# Patient Record
Sex: MALE | Race: WHITE | NOT HISPANIC OR LATINO | Employment: PART TIME | URBAN - METROPOLITAN AREA
[De-identification: names, ages, dates, MRNs, and addresses within clinical notes are randomized per-mention and may not be internally consistent; named-entity substitution may affect disease eponyms.]

---

## 2019-12-24 ENCOUNTER — OFFICE VISIT (OUTPATIENT)
Dept: FAMILY MEDICINE CLINIC | Facility: CLINIC | Age: 19
End: 2019-12-24

## 2019-12-24 VITALS
WEIGHT: 282 LBS | DIASTOLIC BLOOD PRESSURE: 94 MMHG | RESPIRATION RATE: 16 BRPM | BODY MASS INDEX: 37.37 KG/M2 | TEMPERATURE: 98.4 F | HEIGHT: 73 IN | SYSTOLIC BLOOD PRESSURE: 132 MMHG | HEART RATE: 76 BPM

## 2019-12-24 DIAGNOSIS — E66.9 OBESITY, UNSPECIFIED CLASSIFICATION, UNSPECIFIED OBESITY TYPE, UNSPECIFIED WHETHER SERIOUS COMORBIDITY PRESENT: ICD-10-CM

## 2019-12-24 DIAGNOSIS — Z00.01 ANNUAL VISIT FOR GENERAL ADULT MEDICAL EXAMINATION WITH ABNORMAL FINDINGS: Primary | ICD-10-CM

## 2019-12-24 DIAGNOSIS — L40.9 PSORIASIS: ICD-10-CM

## 2019-12-24 PROBLEM — Z83.3 FAMILY HISTORY OF DIABETES MELLITUS: Status: ACTIVE | Noted: 2019-12-24

## 2019-12-24 PROCEDURE — 99385 PREV VISIT NEW AGE 18-39: CPT | Performed by: FAMILY MEDICINE

## 2019-12-24 RX ORDER — IBUPROFEN 800 MG/1
800 TABLET ORAL EVERY 6 HOURS PRN
Qty: 60 TABLET | Refills: 3 | Status: SHIPPED | OUTPATIENT
Start: 2019-12-24 | End: 2021-02-17

## 2019-12-24 RX ORDER — TRIAMCINOLONE ACETONIDE 1 MG/G
CREAM TOPICAL 2 TIMES DAILY
Qty: 80 G | Refills: 3 | Status: SHIPPED | OUTPATIENT
Start: 2019-12-24 | End: 2022-02-07

## 2019-12-24 NOTE — PROGRESS NOTES
Chief Complaint   Patient presents with   • Rash     New pt     HPI  Seth Wild is a 19 y.o. male that presents to establish care and discuss rash.    Rash: began 1-2 months ago. Behind both ears, L>R. Not pruritic, not painful. Has not tried any medications. Seems to be progressive. Denies exacerbating/relieving symptoms. No personal or family hx of psoriasis.    Review of Systems   Constitutional: Negative for chills, fever and unexpected weight loss.   HENT: Negative for congestion, rhinorrhea and sore throat.    Eyes: Negative for visual disturbance.   Respiratory: Negative for cough and shortness of breath.    Cardiovascular: Negative for chest pain and palpitations.   Gastrointestinal: Negative for abdominal pain, constipation, diarrhea, nausea and vomiting.   Genitourinary: Negative for difficulty urinating and dysuria.   Musculoskeletal: Positive for arthralgias and joint swelling.   Skin: Negative for rash and skin lesions.   Neurological: Negative for weakness and headache.   Psychiatric/Behavioral: Negative for depressed mood. The patient is not nervous/anxious.      The following portions of the patient's history were reviewed and updated as appropriate: problem list, past medical history, past surgical history, allergies, current medications, past social history and past family history.    Problem List Tab  Patient History Tab  Immunizations Tab  Medications Tab  Chart Review Tab  Care Everywhere Tab  Synopsis Tab    PE  Vitals:    12/24/19 0913   BP: 132/94   Pulse: 76   Resp: 16   Temp: 98.4 °F (36.9 °C)     Body mass index is 37.21 kg/m².  General: Well nourished, NAD  Head: AT/NC  Eyes: EOMI, anicteric sclera  ENT: MMM w/o erythema  Neck: Supple, no thyromegaly or LAD  Resp: CTAB, SCR, BS equal  CV: RRR w/o m/r/g; 2+ pulses  GI: Soft, NT/ND, +BS  MSK: 3rd digit of L hand w/ swelling and mild decreased ROM. No nail pits appreciated. O/w FROM  Skin: Warm, dry, intact. Coarse, scaly plaque to  posterior ears bilaterally.  Neuro: Alert and oriented. No focal deficits  Psych: Appropriate mood and affect    Imaging  No Images in the past 120 days found..    Assessment/Plan   Seth Wild is a 19 y.o. male that presents for   Chief Complaint   Patient presents with   • Rash     New pt     Seth was seen today for rash.    Diagnoses and all orders for this visit:    Annual visit for general adult medical examination with abnormal findings  -     CBC & Differential  -     Comprehensive Metabolic Panel  -     Hemoglobin A1c  -     Lipid Panel    Psoriasis: most likely etiology for scaly plaques behind bilateral ears w/ dactylitis of L 3rd digit. Will trial interventions as below w/ close f/u. Advance interventions and consider dermatology referral if not improving at f/u in 2-3 weeks  -     ibuprofen (ADVIL,MOTRIN) 800 MG tablet; Take 1 tablet by mouth Every 6 (Six) Hours As Needed for Mild Pain  or Moderate Pain .  -     triamcinolone (KENALOG) 0.1 % cream; Apply  topically to the appropriate area as directed 2 (Two) Times a Day.  -     C-reactive Protein  -     Sedimentation Rate    Obesity, unspecified classification, unspecified obesity type, unspecified whether serious comorbidity present  -     Hemoglobin A1c  -     Lipid Panel   - Counseled regarding diet, weight loss, exercise    Preventative:  Influenza: unavailable    F/U in 2-3 weeks for psoriasis f/u

## 2020-01-06 ENCOUNTER — OFFICE VISIT (OUTPATIENT)
Dept: FAMILY MEDICINE CLINIC | Facility: CLINIC | Age: 20
End: 2020-01-06

## 2020-01-06 VITALS
RESPIRATION RATE: 16 BRPM | HEART RATE: 88 BPM | WEIGHT: 278 LBS | HEIGHT: 73 IN | SYSTOLIC BLOOD PRESSURE: 148 MMHG | BODY MASS INDEX: 36.84 KG/M2 | DIASTOLIC BLOOD PRESSURE: 80 MMHG | TEMPERATURE: 98.8 F

## 2020-01-06 DIAGNOSIS — L40.9 PSORIASIS: Primary | ICD-10-CM

## 2020-01-06 PROCEDURE — 99213 OFFICE O/P EST LOW 20 MIN: CPT | Performed by: FAMILY MEDICINE

## 2020-01-06 RX ORDER — CALCIPOTRIENE 50 UG/G
CREAM TOPICAL 2 TIMES DAILY
Qty: 120 G | Refills: 2 | Status: SHIPPED | OUTPATIENT
Start: 2020-01-06 | End: 2022-02-07

## 2020-01-06 NOTE — PROGRESS NOTES
Chief Complaint   Patient presents with   • Psoriasis     HPI  Seth Wild is a 19 y.o. male that presents for   Chief Complaint   Patient presents with   • Psoriasis       Arthritis: taking ibuprofen every other day. This helps his pain and ROM of the L 3rd digit. NO other digits involved or further joint pain. He is happy w/ the control that ibuprofen provides.    Plaque psoriasis: he has been using steroid cream (triamcinolone 0.1% cream) once daily or once every other day. His rash is much improved today. The rash is only located behind bilateral ears, R>L. He is happy w/ current control/improvement and does not desire dermatology at this time.    Review of Systems   Constitutional: Negative for chills, fever and unexpected weight loss.   HENT: Negative for congestion and rhinorrhea.    Respiratory: Negative for cough and shortness of breath.    Cardiovascular: Negative for chest pain.   Gastrointestinal: Negative for abdominal pain.   Musculoskeletal: Positive for arthralgias and joint swelling.   Skin: Positive for rash and skin lesions.     The following portions of the patient's history were reviewed and updated as appropriate: problem list, past medical history, past surgical history, allergies, current medications    Problem List Tab  Patient History Tab  Immunizations Tab  Medications Tab  Chart Review Tab  Care Everywhere Tab  Synopsis Tab    PE  Vitals:    01/06/20 0937   BP: 148/80   Pulse: 88   Resp: 16   Temp: 98.8 °F (37.1 °C)     Body mass index is 36.68 kg/m².  General: Well nourished, NAD  Head: AT/NC  Eyes: EOMI, anicteric sclera  Resp: CTAB, SCR, BS equal  CV: RRR w/o m/r/g; 2+ pulses  GI: Soft, NT/ND, +BS  MSK: FROM, no deformity, no edema. No significant swelling or decreased ROM to L 3rd digit (improved)  Skin: Warm, dry, intact. Improved scaly plaques on erythematous bed to posterior ear   Neuro: Alert and oriented. No focal deficits  Psych: Appropriate mood and affect    Imaging  No  Images in the past 120 days found..    Assessment/Plan   Seth Wild is a 19 y.o. male that presents for   Chief Complaint   Patient presents with   • Psoriasis     Seth was seen today for psoriasis.    Diagnoses and all orders for this visit:    Psoriasis  -     calcipotriene (DOVONEX) 0.005 % cream; Apply  topically to the appropriate area as directed 2 (Two) Times a Day.   - Patient instructed to increase triamcinolone to BID as prescribed. Will also start calcipotriene as above. One plaques resolved, instructed patient to discontinue steroid cream and apply calcipotriene daily.   - Cont ibuprofen TID PRN; recommend taking 1-2x/day regularly to attempt to resolve any remaining arthritis/dactylitis   - Defer dermatology referral at this time; pt happy w/ current control/improvement in psoriasis    F/U in 2 months for psoriasis

## 2020-03-25 ENCOUNTER — TELEPHONE (OUTPATIENT)
Dept: FAMILY MEDICINE CLINIC | Facility: CLINIC | Age: 20
End: 2020-03-25

## 2020-03-25 NOTE — TELEPHONE ENCOUNTER
Pt did not come back to have labs drawn that Reema ordered. Please call pt and have them come in on the lab schedule to have them drawn. thanks

## 2020-03-30 NOTE — TELEPHONE ENCOUNTER
Left message 3/30 4:01 pm for pt to call back.  Unfortunately, I have been unsuccessful in getting patient to return my calls. Will close encounter and await call from patient.

## 2020-06-15 DIAGNOSIS — L40.50 PSORIATIC ARTHRITIS (HCC): Primary | ICD-10-CM

## 2020-06-15 RX ORDER — PREDNISONE 20 MG/1
TABLET ORAL
Qty: 15 TABLET | Refills: 0 | Status: SHIPPED | OUTPATIENT
Start: 2020-06-15 | End: 2021-02-17

## 2021-02-17 ENCOUNTER — OFFICE VISIT (OUTPATIENT)
Dept: FAMILY MEDICINE CLINIC | Facility: CLINIC | Age: 21
End: 2021-02-17

## 2021-02-17 ENCOUNTER — LAB (OUTPATIENT)
Dept: FAMILY MEDICINE CLINIC | Facility: CLINIC | Age: 21
End: 2021-02-17

## 2021-02-17 VITALS
RESPIRATION RATE: 16 BRPM | BODY MASS INDEX: 39.89 KG/M2 | DIASTOLIC BLOOD PRESSURE: 78 MMHG | OXYGEN SATURATION: 97 % | HEART RATE: 100 BPM | WEIGHT: 301 LBS | TEMPERATURE: 96.9 F | SYSTOLIC BLOOD PRESSURE: 150 MMHG | HEIGHT: 73 IN

## 2021-02-17 DIAGNOSIS — L40.9 PSORIASIS: ICD-10-CM

## 2021-02-17 DIAGNOSIS — E66.09 CLASS 2 OBESITY DUE TO EXCESS CALORIES WITHOUT SERIOUS COMORBIDITY WITH BODY MASS INDEX (BMI) OF 39.0 TO 39.9 IN ADULT: ICD-10-CM

## 2021-02-17 DIAGNOSIS — I10 ESSENTIAL HYPERTENSION: Primary | ICD-10-CM

## 2021-02-17 LAB
ALBUMIN SERPL-MCNC: 4.7 G/DL (ref 3.5–5.2)
ALBUMIN/GLOB SERPL: 1.9 G/DL
ALP SERPL-CCNC: 51 U/L (ref 39–117)
ALT SERPL W P-5'-P-CCNC: 56 U/L (ref 1–41)
ANION GAP SERPL CALCULATED.3IONS-SCNC: 9.4 MMOL/L (ref 5–15)
AST SERPL-CCNC: 26 U/L (ref 1–40)
BILIRUB SERPL-MCNC: 0.4 MG/DL (ref 0–1.2)
BUN SERPL-MCNC: 12 MG/DL (ref 6–20)
BUN/CREAT SERPL: 15.8 (ref 7–25)
CALCIUM SPEC-SCNC: 10.7 MG/DL (ref 8.6–10.5)
CHLORIDE SERPL-SCNC: 101 MMOL/L (ref 98–107)
CO2 SERPL-SCNC: 27.6 MMOL/L (ref 22–29)
CREAT SERPL-MCNC: 0.76 MG/DL (ref 0.76–1.27)
CRP SERPL-MCNC: <0.3 MG/DL (ref 0–0.5)
ERYTHROCYTE [SEDIMENTATION RATE] IN BLOOD: 3 MM/HR (ref 0–15)
GFR SERPL CREATININE-BSD FRML MDRD: 128 ML/MIN/1.73
GLOBULIN UR ELPH-MCNC: 2.5 GM/DL
GLUCOSE SERPL-MCNC: 94 MG/DL (ref 65–99)
POTASSIUM SERPL-SCNC: 4.7 MMOL/L (ref 3.5–5.2)
PROT SERPL-MCNC: 7.2 G/DL (ref 6–8.5)
SODIUM SERPL-SCNC: 138 MMOL/L (ref 136–145)

## 2021-02-17 PROCEDURE — 85652 RBC SED RATE AUTOMATED: CPT | Performed by: FAMILY MEDICINE

## 2021-02-17 PROCEDURE — 80053 COMPREHEN METABOLIC PANEL: CPT | Performed by: FAMILY MEDICINE

## 2021-02-17 PROCEDURE — 86140 C-REACTIVE PROTEIN: CPT | Performed by: FAMILY MEDICINE

## 2021-02-17 PROCEDURE — 99214 OFFICE O/P EST MOD 30 MIN: CPT | Performed by: FAMILY MEDICINE

## 2021-02-17 RX ORDER — LISINOPRIL 10 MG/1
10 TABLET ORAL DAILY
Qty: 90 TABLET | Refills: 3 | Status: SHIPPED | OUTPATIENT
Start: 2021-02-17 | End: 2022-02-01 | Stop reason: SDUPTHER

## 2021-02-17 NOTE — PROGRESS NOTES
Chief Complaint   Patient presents with   • Hypertension     HPI  Seth Wild is a 20 y.o. male that presents for   Chief Complaint   Patient presents with   • Hypertension     HTN: 150/78 today. He has noticed this has been elevated when he goes to the rheumatologist. Patient reports family history and personal obesity. Interested in BP medicaiton    Psoriasis: patient has established w/ dermatology and rheumatology. He has now been started on Cimzia w/ good control of joint pain.  Plaque psoriasis has returned recently behind both ears.  He has multiple creams that he is using at home    Review of Systems   Constitutional: Positive for unexpected weight gain. Negative for chills and fever.   Eyes: Negative for visual disturbance.   Respiratory: Negative for cough and shortness of breath.    Cardiovascular: Negative for chest pain.   Musculoskeletal: Negative for arthralgias.   Skin: Positive for rash.     The following portions of the patient's history were reviewed and updated as appropriate: problem list, past medical history, past surgical history, allergies, current medications    Problem List Tab  Patient History Tab  Immunizations Tab  Medications Tab  Chart Review Tab  Care Everywhere Tab  Synopsis Tab    PE  Vitals:    02/17/21 0932   BP: 150/78   Pulse: 100   Resp: 16   Temp: 96.9 °F (36.1 °C)   SpO2: 97%     Body mass index is 39.71 kg/m².  General: Obese, NAD  Head: AT/NC  Eyes: EOMI, anicteric sclera  Resp: CTAB, SCR, BS equal  CV: RRR w/o m/r/g; 2+ pulses  GI: Soft, NT/ND, +BS  MSK: FROM, no deformity, no edema  Skin: Warm, dry, intact.  Scaly plaque behind bilateral ears  Neuro: Alert and oriented. No focal deficits  Psych: Appropriate mood and affect    Imaging  No Images in the past 120 days found..    Assessment/Plan   Seth Wild is a 20 y.o. male that presents for   Chief Complaint   Patient presents with   • Hypertension     Diagnoses and all orders for this visit:    1. Essential  hypertension (Primary): 150/78 today  -     Start lisinopril (PRINIVIL,ZESTRIL) 10 MG tablet; Take 1 tablet by mouth Daily.  Dispense: 90 tablet; Refill: 3  -     Comprehensive Metabolic Panel  - Counseled regarding diet, exercise, weight loss    2. Psoriasis: Arthritis well controlled at this time.  Some return of plaque psoriasis behind bilateral ears  -     C-reactive Protein  -     Sedimentation Rate  - Continue Cimzia per rheumatology  - Continue triamcinolone cream as needed per dermatology  - Continue regular rheumatology and dermatology follow-up    3. Class 2 obesity due to excess calories without serious comorbidity with body mass index (BMI) of 39.0 to 39.9 in adult  - Counseled regarding diet, exercise, weight loss     Return in about 4 months (around 6/17/2021) for Annual physical.

## 2021-06-21 ENCOUNTER — OFFICE VISIT (OUTPATIENT)
Dept: FAMILY MEDICINE CLINIC | Facility: CLINIC | Age: 21
End: 2021-06-21

## 2021-06-21 ENCOUNTER — LAB (OUTPATIENT)
Dept: FAMILY MEDICINE CLINIC | Facility: CLINIC | Age: 21
End: 2021-06-21

## 2021-06-21 VITALS
OXYGEN SATURATION: 100 % | DIASTOLIC BLOOD PRESSURE: 82 MMHG | RESPIRATION RATE: 16 BRPM | WEIGHT: 313 LBS | SYSTOLIC BLOOD PRESSURE: 132 MMHG | HEIGHT: 73 IN | TEMPERATURE: 98.1 F | BODY MASS INDEX: 41.48 KG/M2 | HEART RATE: 79 BPM

## 2021-06-21 DIAGNOSIS — I10 ESSENTIAL HYPERTENSION: ICD-10-CM

## 2021-06-21 DIAGNOSIS — Z00.00 ANNUAL PHYSICAL EXAM: Primary | ICD-10-CM

## 2021-06-21 DIAGNOSIS — L40.9 PSORIASIS: ICD-10-CM

## 2021-06-21 LAB
25(OH)D3 SERPL-MCNC: 24.1 NG/ML (ref 30–100)
ALBUMIN SERPL-MCNC: 4.4 G/DL (ref 3.5–5.2)
ALBUMIN/GLOB SERPL: 1.9 G/DL
ALP SERPL-CCNC: 51 U/L (ref 39–117)
ALT SERPL W P-5'-P-CCNC: 53 U/L (ref 1–41)
ANION GAP SERPL CALCULATED.3IONS-SCNC: 8.3 MMOL/L (ref 5–15)
AST SERPL-CCNC: 27 U/L (ref 1–40)
BASOPHILS # BLD AUTO: 0.07 10*3/MM3 (ref 0–0.2)
BASOPHILS NFR BLD AUTO: 0.8 % (ref 0–1.5)
BILIRUB SERPL-MCNC: 0.4 MG/DL (ref 0–1.2)
BUN SERPL-MCNC: 8 MG/DL (ref 6–20)
BUN/CREAT SERPL: 11.8 (ref 7–25)
CALCIUM SPEC-SCNC: 9.3 MG/DL (ref 8.6–10.5)
CHLORIDE SERPL-SCNC: 102 MMOL/L (ref 98–107)
CHOLEST SERPL-MCNC: 173 MG/DL (ref 0–200)
CO2 SERPL-SCNC: 27.7 MMOL/L (ref 22–29)
CREAT SERPL-MCNC: 0.68 MG/DL (ref 0.76–1.27)
DEPRECATED RDW RBC AUTO: 43.1 FL (ref 37–54)
EOSINOPHIL # BLD AUTO: 0.44 10*3/MM3 (ref 0–0.4)
EOSINOPHIL NFR BLD AUTO: 5.1 % (ref 0.3–6.2)
ERYTHROCYTE [DISTWIDTH] IN BLOOD BY AUTOMATED COUNT: 13.1 % (ref 12.3–15.4)
GFR SERPL CREATININE-BSD FRML MDRD: 146 ML/MIN/1.73
GLOBULIN UR ELPH-MCNC: 2.3 GM/DL
GLUCOSE SERPL-MCNC: 89 MG/DL (ref 65–99)
HBA1C MFR BLD: 5.2 % (ref 3.5–5.6)
HCT VFR BLD AUTO: 46.1 % (ref 37.5–51)
HCV AB SER DONR QL: NORMAL
HDLC SERPL-MCNC: 28 MG/DL (ref 40–60)
HGB BLD-MCNC: 15.7 G/DL (ref 13–17.7)
IMM GRANULOCYTES # BLD AUTO: 0.04 10*3/MM3 (ref 0–0.05)
IMM GRANULOCYTES NFR BLD AUTO: 0.5 % (ref 0–0.5)
LDLC SERPL CALC-MCNC: 99 MG/DL (ref 0–100)
LDLC/HDLC SERPL: 3.25 {RATIO}
LYMPHOCYTES # BLD AUTO: 3.62 10*3/MM3 (ref 0.7–3.1)
LYMPHOCYTES NFR BLD AUTO: 42.2 % (ref 19.6–45.3)
MCH RBC QN AUTO: 30.3 PG (ref 26.6–33)
MCHC RBC AUTO-ENTMCNC: 34.1 G/DL (ref 31.5–35.7)
MCV RBC AUTO: 89 FL (ref 79–97)
MONOCYTES # BLD AUTO: 0.62 10*3/MM3 (ref 0.1–0.9)
MONOCYTES NFR BLD AUTO: 7.2 % (ref 5–12)
NEUTROPHILS NFR BLD AUTO: 3.78 10*3/MM3 (ref 1.7–7)
NEUTROPHILS NFR BLD AUTO: 44.2 % (ref 42.7–76)
NRBC BLD AUTO-RTO: 0 /100 WBC (ref 0–0.2)
PLATELET # BLD AUTO: 324 10*3/MM3 (ref 140–450)
PMV BLD AUTO: 10.1 FL (ref 6–12)
POTASSIUM SERPL-SCNC: 3.9 MMOL/L (ref 3.5–5.2)
PROT SERPL-MCNC: 6.7 G/DL (ref 6–8.5)
RBC # BLD AUTO: 5.18 10*6/MM3 (ref 4.14–5.8)
SODIUM SERPL-SCNC: 138 MMOL/L (ref 136–145)
T4 FREE SERPL-MCNC: 1.31 NG/DL (ref 0.93–1.7)
TRIGL SERPL-MCNC: 270 MG/DL (ref 0–150)
TSH SERPL DL<=0.05 MIU/L-ACNC: 2.32 UIU/ML (ref 0.27–4.2)
VIT B12 BLD-MCNC: 369 PG/ML (ref 211–946)
VLDLC SERPL-MCNC: 46 MG/DL (ref 5–40)
WBC # BLD AUTO: 8.57 10*3/MM3 (ref 3.4–10.8)

## 2021-06-21 PROCEDURE — 80053 COMPREHEN METABOLIC PANEL: CPT | Performed by: FAMILY MEDICINE

## 2021-06-21 PROCEDURE — 86803 HEPATITIS C AB TEST: CPT | Performed by: FAMILY MEDICINE

## 2021-06-21 PROCEDURE — 99395 PREV VISIT EST AGE 18-39: CPT | Performed by: FAMILY MEDICINE

## 2021-06-21 PROCEDURE — 80061 LIPID PANEL: CPT | Performed by: FAMILY MEDICINE

## 2021-06-21 PROCEDURE — 83036 HEMOGLOBIN GLYCOSYLATED A1C: CPT | Performed by: FAMILY MEDICINE

## 2021-06-21 PROCEDURE — 90715 TDAP VACCINE 7 YRS/> IM: CPT | Performed by: FAMILY MEDICINE

## 2021-06-21 PROCEDURE — 84439 ASSAY OF FREE THYROXINE: CPT | Performed by: FAMILY MEDICINE

## 2021-06-21 PROCEDURE — 82607 VITAMIN B-12: CPT | Performed by: FAMILY MEDICINE

## 2021-06-21 PROCEDURE — 85025 COMPLETE CBC W/AUTO DIFF WBC: CPT | Performed by: FAMILY MEDICINE

## 2021-06-21 PROCEDURE — 82306 VITAMIN D 25 HYDROXY: CPT | Performed by: FAMILY MEDICINE

## 2021-06-21 PROCEDURE — 90471 IMMUNIZATION ADMIN: CPT | Performed by: FAMILY MEDICINE

## 2021-06-21 PROCEDURE — 36415 COLL VENOUS BLD VENIPUNCTURE: CPT | Performed by: FAMILY MEDICINE

## 2021-06-21 PROCEDURE — 84443 ASSAY THYROID STIM HORMONE: CPT | Performed by: FAMILY MEDICINE

## 2021-06-21 RX ORDER — APREMILAST 30 MG/1
30 TABLET, FILM COATED ORAL DAILY
COMMUNITY
Start: 2021-06-19 | End: 2022-10-05

## 2021-06-21 NOTE — PROGRESS NOTES
Chief Complaint   Patient presents with   • Annual Exam     HPI  Seth Wild is a 20 y.o. male that presents for   Chief Complaint   Patient presents with   • Annual Exam     Psoriasis: follows w/ dermatology- [facility]. Maintained on Cimzia and Otezla daily. He will still use the triamcinolone and Cacipotriene creams PRN for flares. Overall well controlled.     HTN: 132/82 today. Maintained on lisinopril 10 daily. No LH/dizziness, CP or SOB.     Review of Systems   Constitutional: Negative for chills, fever and unexpected weight loss.   HENT: Negative for congestion, rhinorrhea and sore throat.    Eyes: Negative for visual disturbance.   Respiratory: Negative for cough and shortness of breath.    Cardiovascular: Negative for chest pain and palpitations.   Gastrointestinal: Negative for abdominal pain, constipation, diarrhea, nausea and vomiting.   Genitourinary: Negative for difficulty urinating and dysuria.   Musculoskeletal: Negative for arthralgias and joint swelling.   Skin: Positive for rash and skin lesions.   Neurological: Negative for dizziness, weakness, light-headedness and headache.   Psychiatric/Behavioral: Negative for depressed mood. The patient is not nervous/anxious.      The following portions of the patient's history were reviewed and updated as appropriate: problem list, past medical history, past surgical history, allergies, current medications, past social history and past family history.    Problem List Tab  Patient History Tab  Immunizations Tab  Medications Tab  Chart Review Tab  Care Everywhere Tab  Synopsis Tab    PE  Vitals:    06/21/21 0801   BP: 132/82   Pulse: 79   Resp: 16   Temp: 98.1 °F (36.7 °C)   SpO2: 100%     Body mass index is 41.3 kg/m².  General: Obese, NAD  Head: AT/NC  Eyes: EOMI, anicteric sclera  ENT: MMM w/o erythema. TM clear bilaterally  Neck: Supple, no thyromegaly or LAD  Resp: CTAB, SCR, BS equal  CV: RRR w/o m/r/g; 2+ pulses  GI: Soft, NT/ND, +BS  MSK: FROM, no  deformity, no edema  Skin: Warm, dry, intact. Scaly plaques on erythematous bed behind both ears and extending around into the mandible/jaw line  Neuro: Alert and oriented. No focal deficits  Psych: Appropriate mood and affect    Imaging  No Images in the past 120 days found..    Assessment/Plan   Seth Wild is a 20 y.o. male that presents for   Chief Complaint   Patient presents with   • Annual Exam     Diagnoses and all orders for this visit:    1. Annual physical exam (Primary)  -     Tdap Vaccine Greater Than or Equal To 6yo IM  -     Hepatitis C Antibody  -     CBC & Differential  -     Comprehensive Metabolic Panel  -     Hemoglobin A1c  -     Lipid Panel  -     TSH  -     T4, Free  -     Vitamin D 25 Hydroxy  -     Vitamin B12  -  Counseled regarding diet, exercise, weight loss, and preventative health maintenance items/immunizations below    2. Psoriasis   -Continue dermatology [id number]   -Continue home Cimzia and Otezla daily   -Recommend restarting triamcinolone and calcipotriene twice daily given recent flare    3. Essential hypertension: 132/82 today   -Continue home lisinopril 10 mg daily      Preventative:  Tdap: 7/2010, ordered today  Influenza: 11/2020, recommended  COVID: Completed 5/2021 (COVID)    Return in about 1 year (around 6/21/2022) for Annual physical.

## 2022-02-01 ENCOUNTER — TELEPHONE (OUTPATIENT)
Dept: FAMILY MEDICINE CLINIC | Facility: CLINIC | Age: 22
End: 2022-02-01

## 2022-02-01 DIAGNOSIS — I10 ESSENTIAL HYPERTENSION: ICD-10-CM

## 2022-02-01 RX ORDER — LISINOPRIL 10 MG/1
10 TABLET ORAL DAILY
Qty: 90 TABLET | Refills: 3 | Status: SHIPPED | OUTPATIENT
Start: 2022-02-01

## 2022-02-01 NOTE — TELEPHONE ENCOUNTER
Incoming Refill Request      Medication requested (name and dose):   lisinopril (PRINIVIL,ZESTRIL) 10 MG tablet  10 mg, Daily         Pharmacy where request should be sent: [facility] [place] - [address] [place] [address]  - [phone]  - [phone] FX  [phone]    Additional details provided by patient: PATIENT SAID HE HAS BEEN OFF HIS LISINOPRIL A COUPLE MONTHS NOW, AND HAS NOTICED HIS BLOOD PRESSURE IS ELEVATED     HE HAS AN APPOINTMENT WITH DR. BOONE Friday, 02/04/22    Best call back number: [phone]    Does the patient have less than a 3 day supply:  [x] Yes  [] No    Yesenia Sousa Rep  02/01/22, 11:39 EST

## 2022-02-07 ENCOUNTER — OFFICE VISIT (OUTPATIENT)
Dept: FAMILY MEDICINE CLINIC | Facility: CLINIC | Age: 22
End: 2022-02-07

## 2022-02-07 VITALS
DIASTOLIC BLOOD PRESSURE: 86 MMHG | HEART RATE: 140 BPM | TEMPERATURE: 96.9 F | BODY MASS INDEX: 40.85 KG/M2 | WEIGHT: 308.2 LBS | RESPIRATION RATE: 22 BRPM | OXYGEN SATURATION: 100 % | SYSTOLIC BLOOD PRESSURE: 140 MMHG | HEIGHT: 73 IN

## 2022-02-07 DIAGNOSIS — I10 ESSENTIAL HYPERTENSION: Primary | ICD-10-CM

## 2022-02-07 DIAGNOSIS — F41.9 ANXIETY: ICD-10-CM

## 2022-02-07 PROCEDURE — 99214 OFFICE O/P EST MOD 30 MIN: CPT | Performed by: FAMILY MEDICINE

## 2022-02-07 RX ORDER — ESCITALOPRAM OXALATE 10 MG/1
10 TABLET ORAL DAILY
Qty: 90 TABLET | Refills: 1 | Status: SHIPPED | OUTPATIENT
Start: 2022-02-07 | End: 2022-10-05

## 2022-02-07 RX ORDER — HYDROXYZINE HYDROCHLORIDE 25 MG/1
25 TABLET, FILM COATED ORAL 3 TIMES DAILY PRN
Qty: 50 TABLET | Refills: 3 | Status: SHIPPED | OUTPATIENT
Start: 2022-02-07

## 2022-02-07 RX ORDER — PROPRANOLOL HCL 60 MG
60 CAPSULE, EXTENDED RELEASE 24HR ORAL DAILY
Qty: 90 CAPSULE | Refills: 1 | Status: SHIPPED | OUTPATIENT
Start: 2022-02-07

## 2022-02-07 NOTE — PROGRESS NOTES
Chief Complaint   Patient presents with   • Hypertension     HPI  Seth Wild is a 21 y.o. male that presents for   Chief Complaint   Patient presents with   • Hypertension     HTN: 140/86 today. Maintained on lisinopril 10 daily. Had been off this medication but got started back 1 week ago. No LH/dizziness, CP or SOB.    Anxiety/depression: patient reports recent panic attack 1 week ago. He reports having a stressful day. This seemed to start w/ a stressful work meeting, which led to heart flutter/palpitation, which then led to concern/stress about his health. No SI/HI. Anxiety is triggered by work and social situations.     Review of Systems   Respiratory: Negative for cough and shortness of breath.    Cardiovascular: Positive for palpitations. Negative for chest pain.   Neurological: Negative for dizziness and light-headedness.   Psychiatric/Behavioral: Positive for stress. Negative for suicidal ideas. The patient is nervous/anxious.      The following portions of the patient's history were reviewed and updated as appropriate: problem list, past medical history, past surgical history, allergies, current medication    Problem List Tab  Patient History Tab  Immunizations Tab  Medications Tab  Chart Review Tab  Care Everywhere Tab  Synopsis Tab    PE  Vitals:    02/07/22 1538   BP: 140/86   Pulse: (!) 140   Resp: 22   Temp: 96.9 °F (36.1 °C)   SpO2: 100%     Body mass index is 40.67 kg/m².  General: Well nourished, NAD  Head: AT/NC  Eyes: EOMI, anicteric sclera  Resp: CTAB, SCR, BS equal  CV: Tachycardic with RR w/o m/r/g; 2+ pulses  GI: Soft, NT/ND, +BS  MSK: FROM, no deformity, no edema  Skin: Warm, dry, intact.  Scaly plaques behind bilateral ears  Neuro: Alert and oriented. No focal deficits  Psych: Appropriate mood and affect    Imaging  No Images in the past 120 days found..    Assessment/Plan   Seth Wild is a 21 y.o. male that presents for   Chief Complaint   Patient presents with   •  Hypertension     Diagnoses and all orders for this visit:    1. Essential hypertension (Primary): 140/86 today with heart rate 140  -     Start propranolol LA (Inderal LA) 60 MG 24 hr capsule; Take 1 capsule by mouth Daily.  Dispense: 90 capsule; Refill: 1  - Continue home lisinopril 10 daily    2. Anxiety: Notable social anxiety.  Work also triggers anxiety and results in palpitations  -     Start propranolol LA (Inderal LA) 60 MG 24 hr capsule; Take 1 capsule by mouth Daily.  Dispense: 90 capsule; Refill: 1  -     Start escitalopram (Lexapro) 10 MG tablet; Take 1 tablet by mouth Daily.  Dispense: 90 tablet; Refill: 1  -     Start hydrOXYzine (ATARAX) 25 MG tablet; Take 1 tablet by mouth 3 (Three) Times a Day As Needed for Anxiety.  Dispense: 50 tablet; Refill: 3  - Recommend counseling/therapy and regular exercise     Return in about 12 weeks (around 5/2/2022) for Recheck- 30min- HTN, anxiety.

## 2022-02-13 ENCOUNTER — HOSPITAL ENCOUNTER (EMERGENCY)
Facility: HOSPITAL | Age: 22
Discharge: PSYCHIATRIC OR SUBSTANCE USE FACILITY | End: 2022-02-13
Attending: EMERGENCY MEDICINE | Admitting: EMERGENCY MEDICINE

## 2022-02-13 ENCOUNTER — HOSPITAL ENCOUNTER (EMERGENCY)
Facility: HOSPITAL | Age: 22
Discharge: LEFT BEFORE DISCHARGE | End: 2022-02-13
Attending: EMERGENCY MEDICINE

## 2022-02-13 ENCOUNTER — TELEPHONE (OUTPATIENT)
Dept: FAMILY MEDICINE CLINIC | Facility: CLINIC | Age: 22
End: 2022-02-13

## 2022-02-13 VITALS
HEIGHT: 75 IN | BODY MASS INDEX: 38.42 KG/M2 | SYSTOLIC BLOOD PRESSURE: 154 MMHG | DIASTOLIC BLOOD PRESSURE: 94 MMHG | OXYGEN SATURATION: 96 % | HEART RATE: 96 BPM | RESPIRATION RATE: 14 BRPM | WEIGHT: 309 LBS | TEMPERATURE: 98.2 F

## 2022-02-13 VITALS
HEIGHT: 74 IN | RESPIRATION RATE: 16 BRPM | OXYGEN SATURATION: 97 % | SYSTOLIC BLOOD PRESSURE: 151 MMHG | HEART RATE: 97 BPM | DIASTOLIC BLOOD PRESSURE: 100 MMHG | BODY MASS INDEX: 39.61 KG/M2 | TEMPERATURE: 97 F | WEIGHT: 308.64 LBS

## 2022-02-13 DIAGNOSIS — R45.851 SUICIDAL IDEATION: ICD-10-CM

## 2022-02-13 DIAGNOSIS — F41.9 ANXIETY: Primary | ICD-10-CM

## 2022-02-13 LAB
AMPHET+METHAMPHET UR QL: NEGATIVE
ANION GAP SERPL CALCULATED.3IONS-SCNC: 12 MMOL/L (ref 5–15)
BARBITURATES UR QL SCN: NEGATIVE
BASOPHILS # BLD AUTO: 0.1 10*3/MM3 (ref 0–0.2)
BASOPHILS NFR BLD AUTO: 0.9 % (ref 0–1.5)
BENZODIAZ UR QL SCN: NEGATIVE
BUN SERPL-MCNC: 6 MG/DL (ref 6–20)
BUN/CREAT SERPL: 10.3 (ref 7–25)
CALCIUM SPEC-SCNC: 9.6 MG/DL (ref 8.6–10.5)
CANNABINOIDS SERPL QL: NEGATIVE
CHLORIDE SERPL-SCNC: 102 MMOL/L (ref 98–107)
CO2 SERPL-SCNC: 24 MMOL/L (ref 22–29)
COCAINE UR QL: NEGATIVE
CREAT SERPL-MCNC: 0.58 MG/DL (ref 0.76–1.27)
DEPRECATED RDW RBC AUTO: 38.5 FL (ref 37–54)
EOSINOPHIL # BLD AUTO: 0.2 10*3/MM3 (ref 0–0.4)
EOSINOPHIL NFR BLD AUTO: 1.6 % (ref 0.3–6.2)
ERYTHROCYTE [DISTWIDTH] IN BLOOD BY AUTOMATED COUNT: 12.9 % (ref 12.3–15.4)
GFR SERPL CREATININE-BSD FRML MDRD: >150 ML/MIN/1.73
GLUCOSE SERPL-MCNC: 104 MG/DL (ref 65–99)
HCT VFR BLD AUTO: 43.5 % (ref 37.5–51)
HGB BLD-MCNC: 15.4 G/DL (ref 13–17.7)
LYMPHOCYTES # BLD AUTO: 4.4 10*3/MM3 (ref 0.7–3.1)
LYMPHOCYTES NFR BLD AUTO: 35.3 % (ref 19.6–45.3)
MCH RBC QN AUTO: 30.1 PG (ref 26.6–33)
MCHC RBC AUTO-ENTMCNC: 35.4 G/DL (ref 31.5–35.7)
MCV RBC AUTO: 85.1 FL (ref 79–97)
METHADONE UR QL SCN: NEGATIVE
MONOCYTES # BLD AUTO: 0.9 10*3/MM3 (ref 0.1–0.9)
MONOCYTES NFR BLD AUTO: 7.1 % (ref 5–12)
NEUTROPHILS NFR BLD AUTO: 55.1 % (ref 42.7–76)
NEUTROPHILS NFR BLD AUTO: 6.9 10*3/MM3 (ref 1.7–7)
NRBC BLD AUTO-RTO: 0.2 /100 WBC (ref 0–0.2)
OPIATES UR QL: NEGATIVE
OXYCODONE UR QL SCN: NEGATIVE
PLATELET # BLD AUTO: 289 10*3/MM3 (ref 140–450)
PMV BLD AUTO: 7.4 FL (ref 6–12)
POTASSIUM SERPL-SCNC: 3.5 MMOL/L (ref 3.5–5.2)
RBC # BLD AUTO: 5.11 10*6/MM3 (ref 4.14–5.8)
SARS-COV-2 RNA PNL SPEC NAA+PROBE: NOT DETECTED
SODIUM SERPL-SCNC: 138 MMOL/L (ref 136–145)
WBC NRBC COR # BLD: 12.5 10*3/MM3 (ref 3.4–10.8)

## 2022-02-13 PROCEDURE — 80048 BASIC METABOLIC PNL TOTAL CA: CPT | Performed by: NURSE PRACTITIONER

## 2022-02-13 PROCEDURE — 99211 OFF/OP EST MAY X REQ PHY/QHP: CPT | Performed by: EMERGENCY MEDICINE

## 2022-02-13 PROCEDURE — 80307 DRUG TEST PRSMV CHEM ANLYZR: CPT | Performed by: NURSE PRACTITIONER

## 2022-02-13 PROCEDURE — 99284 EMERGENCY DEPT VISIT MOD MDM: CPT

## 2022-02-13 PROCEDURE — C9803 HOPD COVID-19 SPEC COLLECT: HCPCS

## 2022-02-13 PROCEDURE — U0003 INFECTIOUS AGENT DETECTION BY NUCLEIC ACID (DNA OR RNA); SEVERE ACUTE RESPIRATORY SYNDROME CORONAVIRUS 2 (SARS-COV-2) (CORONAVIRUS DISEASE [COVID-19]), AMPLIFIED PROBE TECHNIQUE, MAKING USE OF HIGH THROUGHPUT TECHNOLOGIES AS DESCRIBED BY CMS-2020-01-R: HCPCS | Performed by: NURSE PRACTITIONER

## 2022-02-13 PROCEDURE — 85025 COMPLETE CBC W/AUTO DIFF WBC: CPT | Performed by: NURSE PRACTITIONER

## 2022-02-13 RX ORDER — SODIUM CHLORIDE 0.9 % (FLUSH) 0.9 %
10 SYRINGE (ML) INJECTION AS NEEDED
Status: DISCONTINUED | OUTPATIENT
Start: 2022-02-13 | End: 2022-02-14 | Stop reason: HOSPADM

## 2022-02-13 NOTE — TELEPHONE ENCOUNTER
Seth called with c/o medication side effects since starting Lexapro on Tuesday or Wednesday.  The last does of Lexapro was taken on Saturday 2/12/22 pm.  He c/o insomnia, anxiousness/restless, having mood swings - between euphoria and very low, becoming tearful while talking with me.  I advised him to go to the Emergency Room for evaluation, and to follow-up with Dr. Benavidez, he verbalized agreement to this plan.

## 2022-02-13 NOTE — ED NOTES
Patient reported he wanted to leave and that he was concerned over ER bill and billing and financial assistance was explained to patient and patient stated again that he wanted to leave and left ER     Gypsy Raymond, RN  02/13/22 7468

## 2022-02-13 NOTE — ED PROVIDER NOTES
Subjective    Chief Complaint   Patient presents with   • Allergic Reaction     began taking Lexapro this week and since has had insomnia, anxiety and restlesness      Nigel Benavidez MD  No LMP for male patient.  No Known Allergies    Patient is a 21-year-old male presents to the emergency department for insomnia, increased anxiety, restlessness.  Patient was already on Lexapro this week for anxiety, this was on Tuesday.  He reports he since had the above symptoms.  He does report having thoughts of wanting to harm himself without a plan.  He is not been on any medications for anxiety in the past.  Patient denies any pain or discomfort.  He has his family at bedside.          Review of Systems   Constitutional: Negative for chills and fever.   Respiratory: Negative for shortness of breath.    Cardiovascular: Negative for chest pain.   Gastrointestinal: Negative for abdominal pain, diarrhea, nausea and vomiting.   Musculoskeletal: Negative for back pain, myalgias and neck pain.   Skin: Negative for rash.   Neurological: Negative for dizziness, seizures, syncope, light-headedness and headaches.   Psychiatric/Behavioral: Positive for decreased concentration, sleep disturbance and suicidal ideas. Negative for confusion. The patient is nervous/anxious.        Past Medical History:   Diagnosis Date   • Psoriasis        No Known Allergies    History reviewed. No pertinent surgical history.    Family History   Problem Relation Age of Onset   • Diabetes Maternal Grandmother    • No Known Problems Mother    • No Known Problems Father    • No Known Problems Maternal Grandfather    • No Known Problems Paternal Grandmother    • No Known Problems Paternal Grandfather        Social History     Socioeconomic History   • Marital status: Single   Tobacco Use   • Smoking status: Never Smoker   • Smokeless tobacco: Never Used   Vaping Use   • Vaping Use: Never used   Substance and Sexual Activity   • Alcohol use: Never   • Drug  use: Never   • Sexual activity: Not Currently           Objective   Physical Exam  Vitals and nursing note reviewed.   Constitutional:       General: He is not in acute distress.     Appearance: Normal appearance. He is not ill-appearing, toxic-appearing or diaphoretic.   HENT:      Head: Normocephalic and atraumatic.      Nose: Nose normal.      Mouth/Throat:      Mouth: Mucous membranes are moist.      Pharynx: Oropharynx is clear.   Eyes:      Extraocular Movements: Extraocular movements intact.      Conjunctiva/sclera: Conjunctivae normal.      Pupils: Pupils are equal, round, and reactive to light.   Cardiovascular:      Rate and Rhythm: Normal rate and regular rhythm.      Heart sounds: Normal heart sounds. No murmur heard.  No friction rub. No gallop.    Pulmonary:      Breath sounds: Normal breath sounds.   Abdominal:      General: Bowel sounds are normal.      Palpations: Abdomen is soft.   Musculoskeletal:         General: Normal range of motion.      Cervical back: Normal range of motion and neck supple.   Skin:     General: Skin is warm and dry.      Findings: No erythema or rash.   Neurological:      Mental Status: He is alert and oriented to person, place, and time.   Psychiatric:         Attention and Perception: Attention and perception normal.         Mood and Affect: Mood is anxious.         Speech: Speech is rapid and pressured.         Behavior: Behavior is hyperactive. Behavior is cooperative.         Thought Content: Thought content includes suicidal ideation. Thought content does not include homicidal ideation. Thought content does not include homicidal or suicidal plan.         Judgment: Judgment normal.         Procedures           ED Course  ED Course as of 02/13/22 2229   [facility]   2[facility] accepts for inpatient treatment [LB]      ED Course User Index  [LB] Brittny Abraham, APRN           /96   Pulse 101   Temp  "98.2 °F (36.8 °C) (Temporal)   Resp 16   Ht 190.5 cm (75\")   Wt (!) 140 kg (309 lb)   SpO2 93%   BMI 38.62 kg/m²   Labs Reviewed   BASIC METABOLIC PANEL - Abnormal; Notable for the following components:       Result Value    Glucose 104 (*)     Creatinine 0.58 (*)     All other components within normal limits    Narrative:     GFR Normal >60  Chronic Kidney Disease <60  Kidney Failure <15     CBC WITH AUTO DIFFERENTIAL - Abnormal; Notable for the following components:    WBC 12.50 (*)     Lymphocytes, Absolute 4.40 (*)     All other components within normal limits   COVID-19,CEPHEID/JOSR,COR/OMAR/PAD/YONNY IN-HOUSE,NP SWAB IN TRANSPORT MEDIA 3-4 HR TAT, RT-PCR - Normal    Narrative:     Fact sheet for providers: https://www.fda.gov/media/278658/download     Fact sheet for patients: https://www.fda.gov/media/105791/download  Fact sheet for providers: https://www.fda.gov/media/453205/download     Fact sheet for patients: https://www.fda.gov/media/090971/download   URINE DRUG SCREEN - Normal    Narrative:     Negative Thresholds Per Drugs Screened:    Amphetamines                 500 ng/ml  Barbiturates                 200 ng/ml  Benzodiazepines              100 ng/ml  Cocaine                      300 ng/ml  Methadone                    300 ng/ml  Opiates                      300 ng/ml  Oxycodone                    100 ng/ml  THC                           50 ng/ml    The Normal Value for all drugs tested is negative. This report includes final unconfirmed screening results to be used for medical treatment purposes only. Unconfirmed results must not be used for non-medical purposes such as employment or legal testing. Clinical consideration should be applied to any drug of abuse test, particularly when unconfirmed results are used.          All urine drugs of abuse requests without chain of custody are for medical screening purposes only.  False positives are possible.     CBC AND DIFFERENTIAL    Narrative:     The " following orders were created for panel order CBC & Differential.  Procedure                               Abnormality         Status                     ---------                               -----------         ------                     CBC Auto Differential[[id number]]        Abnormal            Final result                 Please view results for these tests on the individual orders.     Medications   sodium chloride 0.9 % flush 10 mL (has no administration in time range)     No radiology results for the last day                                         MDM  Number of Diagnoses or Management Options  Anxiety  Suicidal ideation  Diagnosis management comments: Patient is a 21-year-old male presents emergency department with complaint of increasing anxiety, racing thoughts, passive thoughts of wanting to harm himself.  He reports this began after he started taking Lexapro this week.  He is started Lexapro on Tuesday.  Given patient's suicidal thoughts, he was abided by [facility], and he will be transferred there for admission for evaluation.  Patient is very anxious in the emergency department, where he is remaining calm and cooperative.  He agrees to treatment.  On disposition he is awake alert and oriented, no acute distress.       Amount and/or Complexity of Data Reviewed  Clinical lab tests: reviewed        Final diagnoses:   Anxiety   Suicidal ideation       ED Disposition  ED Disposition     ED Disposition Condition Comment    Transfer to Another Facility             No follow-up provider specified.       Medication List      No changes were made to your prescriptions during this visit.          Brittny Abraham, APRN  02/13/22 1407

## 2022-02-14 NOTE — ED NOTES
"Pt began Lexipro this past week. Pt stated yesterday he thought to \"grab all my medications, but I stopped there.\" Pt stated that he briefly thought of ingesting medications \"but I just pushed them away. I didn't want to hurt myself.\" Pt stated that he was having a \"mood swing\" today and began counting down. Pt stated \"when I was counting down I thought scissors but I pushed that thought out.\" When I asked I he had any intention of doing anything with scissors and pt stated \"no, not at all.\"      Jonas Engel, RN  02/13/22 1905    "

## 2022-08-30 ENCOUNTER — TELEPHONE (OUTPATIENT)
Dept: FAMILY MEDICINE CLINIC | Facility: CLINIC | Age: 22
End: 2022-08-30

## 2022-08-30 NOTE — TELEPHONE ENCOUNTER
Caller: Seth Wild    Relationship: Self    Best call back number: [phone]    What is the medical concern/diagnosis: ELEVATED LIVER ENZYMES     What specialty or service is being requested: GASTROENTEROLOGY     What is the provider, practice or medical service name:     [facility] - GASTROENTEROLOGY [facility]     What is the office location:     [address] [place] [address]    What is the office phone number: [phone]    Any additional details: PATIENT RECENTLY HAD   AN APPOINTMENT WITH HIS RHEUMATOLOGIST AND THEY SAID HE HAD ELEVATED LIVER ENZYMES AND RECCOMMENDED HE SEE A GASTROENTEROLOGIST

## 2022-10-05 ENCOUNTER — OFFICE VISIT (OUTPATIENT)
Dept: FAMILY MEDICINE CLINIC | Facility: CLINIC | Age: 22
End: 2022-10-05

## 2022-10-05 ENCOUNTER — LAB (OUTPATIENT)
Dept: FAMILY MEDICINE CLINIC | Facility: CLINIC | Age: 22
End: 2022-10-05

## 2022-10-05 VITALS
BODY MASS INDEX: 39.17 KG/M2 | WEIGHT: 315 LBS | SYSTOLIC BLOOD PRESSURE: 150 MMHG | OXYGEN SATURATION: 98 % | DIASTOLIC BLOOD PRESSURE: 82 MMHG | HEART RATE: 131 BPM | TEMPERATURE: 97.1 F | RESPIRATION RATE: 16 BRPM | HEIGHT: 75 IN

## 2022-10-05 DIAGNOSIS — F32.A DEPRESSION, UNSPECIFIED DEPRESSION TYPE: ICD-10-CM

## 2022-10-05 DIAGNOSIS — R74.8 ELEVATED LIVER ENZYMES: Primary | ICD-10-CM

## 2022-10-05 DIAGNOSIS — L40.9 PSORIASIS: ICD-10-CM

## 2022-10-05 PROCEDURE — 99215 OFFICE O/P EST HI 40 MIN: CPT | Performed by: FAMILY MEDICINE

## 2022-10-05 RX ORDER — CALCIPOTRIENE 50 UG/G
1 OINTMENT TOPICAL 2 TIMES DAILY
Qty: 120 G | Refills: 2 | Status: SHIPPED | OUTPATIENT
Start: 2022-10-05

## 2022-10-05 RX ORDER — BETAMETHASONE DIPROPIONATE 0.5 MG/G
1 CREAM TOPICAL 2 TIMES DAILY
Qty: 45 G | Refills: 2 | Status: SHIPPED | OUTPATIENT
Start: 2022-10-05

## 2022-10-05 NOTE — PROGRESS NOTES
Chief Complaint   Patient presents with   • Follow-up     PATIENT WAS TOLD BY HIS RHEUMATOLOGIST HE HAS ELEVATED LIVER ENZYMES.       HPI  Seth Wild is a 21 y.o. male that presents for   Chief Complaint   Patient presents with   • Follow-up     PATIENT WAS TOLD BY HIS RHEUMATOLOGIST HE HAS ELEVATED LIVER ENZYMES.       Elevated liver enzymes: patient reports that his rheumatologist has made him aware that his liver enzymes have become increasingly elevated. Because of this, he has been referred to GI for further evaluation. Rheumatology has subsequently discontinued his Cimzia in June 2022. August 2022 labs indicate liver enzymes have not improved.    Psoriasis: arthritis continues to be reasonably controlled. Rash is getting notably worse and would like to get back to dermatology    Anxiety/depression: poorly controlled recently. Prescribed Lexapro 10 daily but had bad reaction- felt very anxious/paranoid and SI. Notable weight gain recently. Longs for more social connection. No regular exercise    Review of Systems   Gastrointestinal: Negative for abdominal pain, nausea and vomiting.   Musculoskeletal: Negative for arthralgias.   Skin: Positive for rash.   Psychiatric/Behavioral: Positive for dysphoric mood, depressed mood and stress. The patient is nervous/anxious.    The following portions of the patient's history were reviewed and updated as appropriate: problem list, past medical history, past surgical history, allergies, current medication    Problem List Tab  Patient History Tab  Immunizations Tab  Medications Tab  Chart Review Tab  Care Everywhere Tab  Synopsis Tab    PE  Vitals:    10/05/22 1444   BP: 150/82   Pulse: (!) 131   Resp: 16   Temp: 97.1 °F (36.2 °C)   SpO2: 98%     Body mass index is 40.37 kg/m².  General: Obese, tearful  Head: AT/NC  Eyes: EOMI, anicteric sclera  Resp: CTAB, SCR, BS equal  CV: RRR w/o m/r/g; 2+ pulses  GI: Soft, NT/ND, +BS  MSK: FROM, no deformity, no edema  Skin:  Warm, dry, intact.  Scaly plaques to forehead and behind ears  Neuro: Alert and oriented. No focal deficits  Psych: Appropriate mood and affect    Imaging  No Images in the past 120 days found..    Assessment & Plan   Seth Wild is a 21 y.o. male that presents for   Chief Complaint   Patient presents with   • Follow-up     PATIENT WAS TOLD BY HIS RHEUMATOLOGIST HE HAS ELEVATED LIVER ENZYMES.       Diagnoses and all orders for this visit:    1. Elevated liver enzymes (Primary): Unclear etiology.  Noted by rheumatology for months and patient now here for follow-up.  Patient has been off of Cimzia since June.  Subsequently, liver enzymes have stabilized with ALT around 300 and AST around 150.  Do not seem to be improving after 2 months of discontinuation of Cimzia.  This raises concern for alternative etiology.  Rheumatology has referred the patient to GI but does not have an appointment until December.  Will obtain work-up as below  -     US Abdomen Limited; Future  -     Comprehensive Metabolic Panel  -     Ceruloplasmin  -     Ferritin  -     Anti-Smooth Muscle Antibody Titer  -     DENY  -     C-reactive Protein  -     Sedimentation Rate  -     Hepatitis C antibody  -     Hepatitis A antibody, total  -     Hepatitis B surface antigen  -     Hepatitis B surface antibody  -     Hepatitis B core antibody, total  -     CMV IgG IgM  -     EBV Antibody Profile    2. Psoriasis: Due to elevated liver enzymes, patient has been off of Otezla and Cimzia for several months now.  His arthritis continues to remain under control but his psoriatic rash has returned.  Will provide topical creams as below and refer to dermatology for further recommendations.  Understand that options will be limited in the setting of elevated liver enzymes and pending work-up  -     Ambulatory Referral to Dermatology  -     calcipotriene (DOVONOX) 0.005 % ointment; Apply 1 application topically to the appropriate area as directed 2 (Two) Times  a Day.  Dispense: 120 g; Refill: 2  -     betamethasone dipropionate 0.05 % cream; Apply 1 application topically to the appropriate area as directed 2 (Two) Times a Day.  Dispense: 45 g; Refill: 2    3. Depression, unspecified depression type: Patient tearful today.  He has difficulty putting his feelings into words but makes comments along the lines of struggling with meaning in his life and social bonds.  We did try the patient on Lexapro earlier this year and patient had reaction in which she was more anxious, unable to sleep, agitated.  Concern that this may represent a manic episode with initiation of antidepressant.  Would consider initiation of a mood stabilizer or medication such as Vraylar at low-dose.  However, patient is not comfortable with me making further recommendations as I recommended he start Lexapro previously.  He is requesting an opinion from psychiatry.  We will provide psychiatry referral and list of counselors/therapist today  -     Ambulatory Referral to Psychiatry  - List of counselors/therapist provided today     Return in about 4 weeks (around 11/2/2022) for Recheck- 30min.   49 minutes spent on the day of service face-to-face with the patient obtaining history, performing physical, placing orders, counseling the patient, and documenting

## 2022-12-09 ENCOUNTER — OFFICE (OUTPATIENT)
Dept: URBAN - METROPOLITAN AREA CLINIC 64 | Facility: CLINIC | Age: 22
End: 2022-12-09

## 2022-12-09 VITALS
DIASTOLIC BLOOD PRESSURE: 89 MMHG | HEART RATE: 136 BPM | SYSTOLIC BLOOD PRESSURE: 101 MMHG | HEIGHT: 74 IN | WEIGHT: 315 LBS

## 2022-12-09 DIAGNOSIS — R94.5 ABNORMAL RESULTS OF LIVER FUNCTION STUDIES: ICD-10-CM

## 2022-12-09 PROCEDURE — 99204 OFFICE O/P NEW MOD 45 MIN: CPT | Performed by: INTERNAL MEDICINE

## 2023-01-13 ENCOUNTER — OFFICE (OUTPATIENT)
Dept: URBAN - METROPOLITAN AREA CLINIC 64 | Facility: CLINIC | Age: 23
End: 2023-01-13

## 2023-01-13 VITALS
HEART RATE: 117 BPM | DIASTOLIC BLOOD PRESSURE: 113 MMHG | WEIGHT: 315 LBS | HEIGHT: 74 IN | SYSTOLIC BLOOD PRESSURE: 145 MMHG

## 2023-01-13 DIAGNOSIS — R94.5 ABNORMAL RESULTS OF LIVER FUNCTION STUDIES: ICD-10-CM

## 2023-01-13 DIAGNOSIS — R79.89 OTHER SPECIFIED ABNORMAL FINDINGS OF BLOOD CHEMISTRY: ICD-10-CM

## 2023-01-13 PROCEDURE — 99214 OFFICE O/P EST MOD 30 MIN: CPT | Performed by: INTERNAL MEDICINE

## 2023-07-03 ENCOUNTER — OFFICE (OUTPATIENT)
Dept: URBAN - METROPOLITAN AREA CLINIC 64 | Facility: CLINIC | Age: 23
End: 2023-07-03

## 2023-07-03 VITALS
HEIGHT: 74 IN | WEIGHT: 315 LBS | HEART RATE: 104 BPM | DIASTOLIC BLOOD PRESSURE: 108 MMHG | SYSTOLIC BLOOD PRESSURE: 142 MMHG

## 2023-07-03 DIAGNOSIS — R63.5 ABNORMAL WEIGHT GAIN: ICD-10-CM

## 2023-07-03 DIAGNOSIS — R94.5 ABNORMAL RESULTS OF LIVER FUNCTION STUDIES: ICD-10-CM

## 2023-07-03 PROCEDURE — 99214 OFFICE O/P EST MOD 30 MIN: CPT | Performed by: INTERNAL MEDICINE

## 2023-11-20 ENCOUNTER — OFFICE (OUTPATIENT)
Dept: URBAN - METROPOLITAN AREA CLINIC 64 | Facility: CLINIC | Age: 23
End: 2023-11-20

## 2023-11-20 VITALS
HEART RATE: 103 BPM | DIASTOLIC BLOOD PRESSURE: 114 MMHG | HEIGHT: 74 IN | SYSTOLIC BLOOD PRESSURE: 147 MMHG | WEIGHT: 315 LBS

## 2023-11-20 DIAGNOSIS — Z22.7 LATENT TUBERCULOSIS: ICD-10-CM

## 2023-11-20 DIAGNOSIS — R79.89 OTHER SPECIFIED ABNORMAL FINDINGS OF BLOOD CHEMISTRY: ICD-10-CM

## 2023-11-20 DIAGNOSIS — R94.5 ABNORMAL RESULTS OF LIVER FUNCTION STUDIES: ICD-10-CM

## 2023-11-20 PROCEDURE — 99214 OFFICE O/P EST MOD 30 MIN: CPT | Performed by: INTERNAL MEDICINE

## 2023-12-27 DIAGNOSIS — I10 ESSENTIAL HYPERTENSION: ICD-10-CM

## 2023-12-27 RX ORDER — LISINOPRIL 10 MG/1
10 TABLET ORAL DAILY
Qty: 90 TABLET | Refills: 0 | Status: SHIPPED | OUTPATIENT
Start: 2023-12-27

## 2023-12-27 NOTE — TELEPHONE ENCOUNTER
Caller: Seth Wild    Relationship: Self    Best call back number: [phone]    Requested Prescriptions:   Requested Prescriptions     Pending Prescriptions Disp Refills    lisinopril (PRINIVIL,ZESTRIL) 10 MG tablet 90 tablet 3     Sig: Take 1 tablet by mouth Daily.        Pharmacy where request should be sent: [facility] [id number]  [place] [address] - [phone]  - [phone]      Last office visit with prescribing clinician: 10/5/2022   Last telemedicine visit with prescribing clinician: Visit date not found   Next office visit with prescribing clinician: Visit date not found     Additional details provided by patient: IS OUT     Does the patient have less than a 3 day supply:  [x] Yes  [] No    Would you like a call back once the refill request has been completed: [] Yes [x] No    If the office needs to give you a call back, can they leave a voicemail: [] Yes [x] No    Keisha Huertas   12/27/23 11:02 EST

## 2024-02-05 ENCOUNTER — OFFICE (OUTPATIENT)
Dept: URBAN - METROPOLITAN AREA CLINIC 64 | Facility: CLINIC | Age: 24
End: 2024-02-05

## 2024-02-05 VITALS
SYSTOLIC BLOOD PRESSURE: 157 MMHG | HEIGHT: 74 IN | WEIGHT: 315 LBS | DIASTOLIC BLOOD PRESSURE: 117 MMHG | SYSTOLIC BLOOD PRESSURE: 166 MMHG | DIASTOLIC BLOOD PRESSURE: 106 MMHG

## 2024-02-05 DIAGNOSIS — Z22.7 LATENT TUBERCULOSIS: ICD-10-CM

## 2024-02-05 DIAGNOSIS — R79.89 OTHER SPECIFIED ABNORMAL FINDINGS OF BLOOD CHEMISTRY: ICD-10-CM

## 2024-02-05 PROCEDURE — 99214 OFFICE O/P EST MOD 30 MIN: CPT | Performed by: INTERNAL MEDICINE

## 2024-03-24 ENCOUNTER — HOSPITAL ENCOUNTER (INPATIENT)
Facility: HOSPITAL | Age: 24
LOS: 1 days | Discharge: HOME | End: 2024-03-26
Attending: EMERGENCY MEDICINE | Admitting: HOSPITALIST
Payer: COMMERCIAL

## 2024-03-24 ENCOUNTER — APPOINTMENT (OUTPATIENT)
Dept: GENERAL RADIOLOGY | Facility: HOSPITAL | Age: 24
End: 2024-03-24
Payer: COMMERCIAL

## 2024-03-24 DIAGNOSIS — E11.00 HYPEROSMOLAR HYPERGLYCEMIC STATE (HHS): ICD-10-CM

## 2024-03-24 DIAGNOSIS — F41.9 ANXIETY: Primary | ICD-10-CM

## 2024-03-24 LAB
ALBUMIN SERPL-MCNC: 4.8 G/DL (ref 3.5–5.2)
ALBUMIN/GLOB SERPL: 1.5 G/DL
ALP SERPL-CCNC: 123 U/L (ref 39–117)
ALT SERPL W P-5'-P-CCNC: 53 U/L (ref 1–41)
ANION GAP SERPL CALCULATED.3IONS-SCNC: 19 MMOL/L (ref 5–15)
AST SERPL-CCNC: 17 U/L (ref 1–40)
ATMOSPHERIC PRESS: ABNORMAL MM[HG]
BASE EXCESS BLDV CALC-SCNC: -0.9 MMOL/L (ref -2–2)
BASOPHILS # BLD AUTO: 0.05 10*3/MM3 (ref 0–0.2)
BASOPHILS NFR BLD AUTO: 0.6 % (ref 0–1.5)
BILIRUB SERPL-MCNC: 0.6 MG/DL (ref 0–1.2)
BILIRUB UR QL STRIP: NEGATIVE
BUN SERPL-MCNC: 13 MG/DL (ref 6–20)
BUN/CREAT SERPL: 12 (ref 7–25)
CALCIUM SPEC-SCNC: 10.8 MG/DL (ref 8.6–10.5)
CHLORIDE SERPL-SCNC: 87 MMOL/L (ref 98–107)
CLARITY UR: CLEAR
CO2 BLDA-SCNC: 26.2 MMOL/L (ref 22–29)
CO2 SERPL-SCNC: 21 MMOL/L (ref 22–29)
COLOR UR: YELLOW
CREAT SERPL-MCNC: 1.08 MG/DL (ref 0.76–1.27)
DEPRECATED RDW RBC AUTO: 39 FL (ref 37–54)
EGFRCR SERPLBLD CKD-EPI 2021: 97.7 ML/MIN/1.73
EOSINOPHIL # BLD AUTO: 0.11 10*3/MM3 (ref 0–0.4)
EOSINOPHIL NFR BLD AUTO: 1.4 % (ref 0.3–6.2)
ERYTHROCYTE [DISTWIDTH] IN BLOOD BY AUTOMATED COUNT: 12.6 % (ref 12.3–15.4)
GLOBULIN UR ELPH-MCNC: 3.3 GM/DL
GLUCOSE BLDC GLUCOMTR-MCNC: >600 MG/DL (ref 70–105)
GLUCOSE BLDC GLUCOMTR-MCNC: >600 MG/DL (ref 70–105)
GLUCOSE SERPL-MCNC: 973 MG/DL (ref 65–99)
GLUCOSE UR STRIP-MCNC: ABNORMAL MG/DL
HCO3 BLDV-SCNC: 24.8 MMOL/L (ref 22–26)
HCT VFR BLD AUTO: 47.6 % (ref 37.5–51)
HGB BLD-MCNC: 16.2 G/DL (ref 13–17.7)
HGB UR QL STRIP.AUTO: NEGATIVE
IMM GRANULOCYTES # BLD AUTO: 0.02 10*3/MM3 (ref 0–0.05)
IMM GRANULOCYTES NFR BLD AUTO: 0.3 % (ref 0–0.5)
INHALED O2 CONCENTRATION: 21 %
KETONES UR QL STRIP: ABNORMAL
LEUKOCYTE ESTERASE UR QL STRIP.AUTO: NEGATIVE
LYMPHOCYTES # BLD AUTO: 2.63 10*3/MM3 (ref 0.7–3.1)
LYMPHOCYTES NFR BLD AUTO: 33.9 % (ref 19.6–45.3)
MCH RBC QN AUTO: 29.2 PG (ref 26.6–33)
MCHC RBC AUTO-ENTMCNC: 34 G/DL (ref 31.5–35.7)
MCV RBC AUTO: 85.9 FL (ref 79–97)
MODALITY: ABNORMAL
MONOCYTES # BLD AUTO: 0.59 10*3/MM3 (ref 0.1–0.9)
MONOCYTES NFR BLD AUTO: 7.6 % (ref 5–12)
NEUTROPHILS NFR BLD AUTO: 4.36 10*3/MM3 (ref 1.7–7)
NEUTROPHILS NFR BLD AUTO: 56.2 % (ref 42.7–76)
NITRITE UR QL STRIP: NEGATIVE
NRBC BLD AUTO-RTO: 0 /100 WBC (ref 0–0.2)
PCO2 BLDV: 43.8 MM HG (ref 42–51)
PH BLDV: 7.36 PH UNITS (ref 7.32–7.43)
PH UR STRIP.AUTO: <=5 [PH] (ref 5–8)
PLATELET # BLD AUTO: 325 10*3/MM3 (ref 140–450)
PMV BLD AUTO: 10.8 FL (ref 6–12)
PO2 BLDV: 44.4 MM HG (ref 40–42)
POTASSIUM SERPL-SCNC: 4.6 MMOL/L (ref 3.5–5.2)
PROT SERPL-MCNC: 8.1 G/DL (ref 6–8.5)
PROT UR QL STRIP: NEGATIVE
RBC # BLD AUTO: 5.54 10*6/MM3 (ref 4.14–5.8)
SAO2 % BLDCOV: 78.2 % (ref 45–75)
SODIUM SERPL-SCNC: 127 MMOL/L (ref 136–145)
SP GR UR STRIP: 1.03 (ref 1–1.03)
TROPONIN T SERPL HS-MCNC: <6 NG/L
UROBILINOGEN UR QL STRIP: ABNORMAL
WBC NRBC COR # BLD AUTO: 7.76 10*3/MM3 (ref 3.4–10.8)

## 2024-03-24 PROCEDURE — 82948 REAGENT STRIP/BLOOD GLUCOSE: CPT | Performed by: PHYSICIAN ASSISTANT

## 2024-03-24 PROCEDURE — 84100 ASSAY OF PHOSPHORUS: CPT | Performed by: PHYSICIAN ASSISTANT

## 2024-03-24 PROCEDURE — 82803 BLOOD GASES ANY COMBINATION: CPT | Performed by: PHYSICIAN ASSISTANT

## 2024-03-24 PROCEDURE — 81003 URINALYSIS AUTO W/O SCOPE: CPT | Performed by: PHYSICIAN ASSISTANT

## 2024-03-24 PROCEDURE — 83930 ASSAY OF BLOOD OSMOLALITY: CPT | Performed by: PHYSICIAN ASSISTANT

## 2024-03-24 PROCEDURE — 71045 X-RAY EXAM CHEST 1 VIEW: CPT

## 2024-03-24 PROCEDURE — 83036 HEMOGLOBIN GLYCOSYLATED A1C: CPT | Performed by: PHYSICIAN ASSISTANT

## 2024-03-24 PROCEDURE — 83735 ASSAY OF MAGNESIUM: CPT | Performed by: PHYSICIAN ASSISTANT

## 2024-03-24 PROCEDURE — 84484 ASSAY OF TROPONIN QUANT: CPT | Performed by: PHYSICIAN ASSISTANT

## 2024-03-24 PROCEDURE — 99285 EMERGENCY DEPT VISIT HI MDM: CPT

## 2024-03-24 PROCEDURE — 80053 COMPREHEN METABOLIC PANEL: CPT | Performed by: PHYSICIAN ASSISTANT

## 2024-03-24 PROCEDURE — 25810000003 SODIUM CHLORIDE 0.9 % SOLUTION: Performed by: PHYSICIAN ASSISTANT

## 2024-03-24 PROCEDURE — 85025 COMPLETE CBC W/AUTO DIFF WBC: CPT | Performed by: PHYSICIAN ASSISTANT

## 2024-03-24 PROCEDURE — 93005 ELECTROCARDIOGRAM TRACING: CPT | Performed by: PHYSICIAN ASSISTANT

## 2024-03-24 RX ORDER — SODIUM CHLORIDE 9 MG/ML
250 INJECTION, SOLUTION INTRAVENOUS CONTINUOUS PRN
Status: DISCONTINUED | OUTPATIENT
Start: 2024-03-24 | End: 2024-03-25

## 2024-03-24 RX ORDER — IBUPROFEN 600 MG/1
1 TABLET ORAL
Status: DISCONTINUED | OUTPATIENT
Start: 2024-03-24 | End: 2024-03-25 | Stop reason: SDUPTHER

## 2024-03-24 RX ORDER — SODIUM CHLORIDE 0.9 % (FLUSH) 0.9 %
10 SYRINGE (ML) INJECTION AS NEEDED
Status: DISCONTINUED | OUTPATIENT
Start: 2024-03-24 | End: 2024-03-26 | Stop reason: HOSPADM

## 2024-03-24 RX ORDER — DEXTROSE AND SODIUM CHLORIDE 5; .9 G/100ML; G/100ML
150 INJECTION, SOLUTION INTRAVENOUS CONTINUOUS PRN
Status: DISCONTINUED | OUTPATIENT
Start: 2024-03-24 | End: 2024-03-25

## 2024-03-24 RX ORDER — DEXTROSE MONOHYDRATE 25 G/50ML
10-50 INJECTION, SOLUTION INTRAVENOUS
Status: DISCONTINUED | OUTPATIENT
Start: 2024-03-24 | End: 2024-03-25

## 2024-03-24 RX ORDER — DEXTROSE MONOHYDRATE, SODIUM CHLORIDE, AND POTASSIUM CHLORIDE 50; 2.98; 4.5 G/1000ML; G/1000ML; G/1000ML
150 INJECTION, SOLUTION INTRAVENOUS CONTINUOUS PRN
Status: DISCONTINUED | OUTPATIENT
Start: 2024-03-24 | End: 2024-03-25

## 2024-03-24 RX ORDER — DEXTROSE MONOHYDRATE, SODIUM CHLORIDE, AND POTASSIUM CHLORIDE 50; 1.49; 9 G/1000ML; G/1000ML; G/1000ML
150 INJECTION, SOLUTION INTRAVENOUS CONTINUOUS PRN
Status: DISCONTINUED | OUTPATIENT
Start: 2024-03-24 | End: 2024-03-25

## 2024-03-24 RX ORDER — DEXTROSE MONOHYDRATE, SODIUM CHLORIDE, AND POTASSIUM CHLORIDE 50; 1.49; 4.5 G/1000ML; G/1000ML; G/1000ML
150 INJECTION, SOLUTION INTRAVENOUS CONTINUOUS PRN
Status: DISCONTINUED | OUTPATIENT
Start: 2024-03-24 | End: 2024-03-25

## 2024-03-24 RX ORDER — DEXTROSE MONOHYDRATE, SODIUM CHLORIDE, AND POTASSIUM CHLORIDE 50; 2.98; 9 G/1000ML; G/1000ML; G/1000ML
150 INJECTION, SOLUTION INTRAVENOUS CONTINUOUS PRN
Status: DISCONTINUED | OUTPATIENT
Start: 2024-03-24 | End: 2024-03-25

## 2024-03-24 RX ORDER — SODIUM CHLORIDE 450 MG/100ML
250 INJECTION, SOLUTION INTRAVENOUS CONTINUOUS PRN
Status: DISCONTINUED | OUTPATIENT
Start: 2024-03-24 | End: 2024-03-25

## 2024-03-24 RX ORDER — SODIUM CHLORIDE 9 MG/ML
40 INJECTION, SOLUTION INTRAVENOUS AS NEEDED
Status: DISCONTINUED | OUTPATIENT
Start: 2024-03-24 | End: 2024-03-26 | Stop reason: HOSPADM

## 2024-03-24 RX ORDER — HYDROXYZINE HYDROCHLORIDE 25 MG/1
25 TABLET, FILM COATED ORAL 3 TIMES DAILY PRN
Qty: 50 TABLET | Refills: 0 | Status: SHIPPED | OUTPATIENT
Start: 2024-03-24 | End: 2024-03-25

## 2024-03-24 RX ORDER — ALBUTEROL SULFATE 2.5 MG/3ML
SOLUTION RESPIRATORY (INHALATION)
Status: DISCONTINUED
Start: 2024-03-24 | End: 2024-03-24 | Stop reason: WASHOUT

## 2024-03-24 RX ORDER — SODIUM CHLORIDE AND POTASSIUM CHLORIDE 150; 450 MG/100ML; MG/100ML
250 INJECTION, SOLUTION INTRAVENOUS CONTINUOUS PRN
Status: DISCONTINUED | OUTPATIENT
Start: 2024-03-24 | End: 2024-03-25

## 2024-03-24 RX ORDER — SODIUM CHLORIDE AND POTASSIUM CHLORIDE 300; 900 MG/100ML; MG/100ML
250 INJECTION, SOLUTION INTRAVENOUS CONTINUOUS PRN
Status: DISCONTINUED | OUTPATIENT
Start: 2024-03-24 | End: 2024-03-25

## 2024-03-24 RX ORDER — SODIUM CHLORIDE 0.9 % (FLUSH) 0.9 %
10 SYRINGE (ML) INJECTION EVERY 12 HOURS SCHEDULED
Status: DISCONTINUED | OUTPATIENT
Start: 2024-03-25 | End: 2024-03-26 | Stop reason: HOSPADM

## 2024-03-24 RX ORDER — SODIUM CHLORIDE AND POTASSIUM CHLORIDE 150; 900 MG/100ML; MG/100ML
250 INJECTION, SOLUTION INTRAVENOUS CONTINUOUS PRN
Status: DISCONTINUED | OUTPATIENT
Start: 2024-03-24 | End: 2024-03-25

## 2024-03-24 RX ORDER — DEXTROSE AND SODIUM CHLORIDE 5; .45 G/100ML; G/100ML
150 INJECTION, SOLUTION INTRAVENOUS CONTINUOUS PRN
Status: DISCONTINUED | OUTPATIENT
Start: 2024-03-24 | End: 2024-03-25

## 2024-03-24 RX ORDER — NICOTINE POLACRILEX 4 MG
15 LOZENGE BUCCAL
Status: DISCONTINUED | OUTPATIENT
Start: 2024-03-24 | End: 2024-03-25 | Stop reason: SDUPTHER

## 2024-03-24 RX ORDER — HYDROXYZINE HYDROCHLORIDE 25 MG/1
25 TABLET, FILM COATED ORAL ONCE
Status: COMPLETED | OUTPATIENT
Start: 2024-03-24 | End: 2024-03-24

## 2024-03-24 RX ORDER — LORAZEPAM 1 MG/1
1 TABLET ORAL ONCE
Status: DISCONTINUED | OUTPATIENT
Start: 2024-03-24 | End: 2024-03-24

## 2024-03-24 RX ADMIN — SODIUM CHLORIDE 2000 ML: 9 INJECTION, SOLUTION INTRAVENOUS at 22:05

## 2024-03-24 RX ADMIN — HYDROXYZINE HYDROCHLORIDE 25 MG: 25 TABLET, FILM COATED ORAL at 22:12

## 2024-03-24 NOTE — Clinical Note
Level of Care: Telemetry [5]   Admitting Physician: OLIMPIA CAPONE [[id number]]   Attending Physician: OLIMPIA CAPONE [[id number]]

## 2024-03-25 PROBLEM — E11.9 NEWLY DIAGNOSED DIABETES: Status: ACTIVE | Noted: 2024-03-25

## 2024-03-25 LAB
ANION GAP SERPL CALCULATED.3IONS-SCNC: 10 MMOL/L (ref 5–15)
ANION GAP SERPL CALCULATED.3IONS-SCNC: 13 MMOL/L (ref 5–15)
ANION GAP SERPL CALCULATED.3IONS-SCNC: 17 MMOL/L (ref 5–15)
ARTICHOKE IGE QN: 106 MG/DL (ref 0–100)
BUN SERPL-MCNC: 10 MG/DL (ref 6–20)
BUN SERPL-MCNC: 12 MG/DL (ref 6–20)
BUN SERPL-MCNC: 9 MG/DL (ref 6–20)
BUN/CREAT SERPL: 12.8 (ref 7–25)
BUN/CREAT SERPL: 13.2 (ref 7–25)
BUN/CREAT SERPL: 14.6 (ref 7–25)
CALCIUM SPEC-SCNC: 10.1 MG/DL (ref 8.6–10.5)
CALCIUM SPEC-SCNC: 9.1 MG/DL (ref 8.6–10.5)
CALCIUM SPEC-SCNC: 9.8 MG/DL (ref 8.6–10.5)
CHLORIDE SERPL-SCNC: 103 MMOL/L (ref 98–107)
CHLORIDE SERPL-SCNC: 105 MMOL/L (ref 98–107)
CHLORIDE SERPL-SCNC: 93 MMOL/L (ref 98–107)
CHOLEST SERPL-MCNC: 284 MG/DL (ref 0–200)
CO2 SERPL-SCNC: 21 MMOL/L (ref 22–29)
CO2 SERPL-SCNC: 25 MMOL/L (ref 22–29)
CO2 SERPL-SCNC: 26 MMOL/L (ref 22–29)
CREAT SERPL-MCNC: 0.68 MG/DL (ref 0.76–1.27)
CREAT SERPL-MCNC: 0.78 MG/DL (ref 0.76–1.27)
CREAT SERPL-MCNC: 0.82 MG/DL (ref 0.76–1.27)
EGFRCR SERPLBLD CKD-EPI 2021: 125 ML/MIN/1.73
EGFRCR SERPLBLD CKD-EPI 2021: 126.9 ML/MIN/1.73
EGFRCR SERPLBLD CKD-EPI 2021: 132.3 ML/MIN/1.73
GLUCOSE BLDC GLUCOMTR-MCNC: 149 MG/DL (ref 70–105)
GLUCOSE BLDC GLUCOMTR-MCNC: 150 MG/DL (ref 70–105)
GLUCOSE BLDC GLUCOMTR-MCNC: 159 MG/DL (ref 70–105)
GLUCOSE BLDC GLUCOMTR-MCNC: 181 MG/DL (ref 70–105)
GLUCOSE BLDC GLUCOMTR-MCNC: 190 MG/DL (ref 70–105)
GLUCOSE BLDC GLUCOMTR-MCNC: 218 MG/DL (ref 70–105)
GLUCOSE BLDC GLUCOMTR-MCNC: 222 MG/DL (ref 70–105)
GLUCOSE BLDC GLUCOMTR-MCNC: 242 MG/DL (ref 70–105)
GLUCOSE BLDC GLUCOMTR-MCNC: 259 MG/DL (ref 70–105)
GLUCOSE BLDC GLUCOMTR-MCNC: 265 MG/DL (ref 70–105)
GLUCOSE BLDC GLUCOMTR-MCNC: 288 MG/DL (ref 70–105)
GLUCOSE BLDC GLUCOMTR-MCNC: 335 MG/DL (ref 70–105)
GLUCOSE BLDC GLUCOMTR-MCNC: 367 MG/DL (ref 70–105)
GLUCOSE BLDC GLUCOMTR-MCNC: 422 MG/DL (ref 70–105)
GLUCOSE BLDC GLUCOMTR-MCNC: 527 MG/DL (ref 70–105)
GLUCOSE SERPL-MCNC: 214 MG/DL (ref 65–99)
GLUCOSE SERPL-MCNC: 291 MG/DL (ref 65–99)
GLUCOSE SERPL-MCNC: 570 MG/DL (ref 65–99)
HBA1C MFR BLD: 10 % (ref 4.8–5.6)
HDLC SERPL-MCNC: ABNORMAL MG/DL
LDLC SERPL CALC-MCNC: ABNORMAL MG/DL
LDLC/HDLC SERPL: ABNORMAL {RATIO}
MAGNESIUM SERPL-MCNC: 2.1 MG/DL (ref 1.6–2.6)
MAGNESIUM SERPL-MCNC: 2.1 MG/DL (ref 1.6–2.6)
MAGNESIUM SERPL-MCNC: 2.3 MG/DL (ref 1.6–2.6)
MAGNESIUM SERPL-MCNC: 2.3 MG/DL (ref 1.6–2.6)
OSMOLALITY SERPL: 329 MOSM/KG (ref 275–295)
PHOSPHATE SERPL-MCNC: 3.2 MG/DL (ref 2.5–4.5)
PHOSPHATE SERPL-MCNC: 3.8 MG/DL (ref 2.5–4.5)
PHOSPHATE SERPL-MCNC: 4.2 MG/DL (ref 2.5–4.5)
PHOSPHATE SERPL-MCNC: 4.7 MG/DL (ref 2.5–4.5)
POTASSIUM SERPL-SCNC: 3.6 MMOL/L (ref 3.5–5.2)
POTASSIUM SERPL-SCNC: 3.9 MMOL/L (ref 3.5–5.2)
POTASSIUM SERPL-SCNC: 4.1 MMOL/L (ref 3.5–5.2)
POTASSIUM SERPL-SCNC: 4.1 MMOL/L (ref 3.5–5.2)
QT INTERVAL: 334 MS
QTC INTERVAL: 419 MS
SODIUM SERPL-SCNC: 131 MMOL/L (ref 136–145)
SODIUM SERPL-SCNC: 141 MMOL/L (ref 136–145)
SODIUM SERPL-SCNC: 141 MMOL/L (ref 136–145)
TRIGL SERPL-MCNC: 1394 MG/DL (ref 0–150)
VLDLC SERPL-MCNC: ABNORMAL MG/DL

## 2024-03-25 PROCEDURE — 80048 BASIC METABOLIC PNL TOTAL CA: CPT | Performed by: PHYSICIAN ASSISTANT

## 2024-03-25 PROCEDURE — 84132 ASSAY OF SERUM POTASSIUM: CPT | Performed by: STUDENT IN AN ORGANIZED HEALTH CARE EDUCATION/TRAINING PROGRAM

## 2024-03-25 PROCEDURE — 82948 REAGENT STRIP/BLOOD GLUCOSE: CPT

## 2024-03-25 PROCEDURE — 84100 ASSAY OF PHOSPHORUS: CPT | Performed by: PHYSICIAN ASSISTANT

## 2024-03-25 PROCEDURE — 63710000001 INSULIN GLARGINE PER 5 UNITS: Performed by: STUDENT IN AN ORGANIZED HEALTH CARE EDUCATION/TRAINING PROGRAM

## 2024-03-25 PROCEDURE — 82948 REAGENT STRIP/BLOOD GLUCOSE: CPT | Performed by: INTERNAL MEDICINE

## 2024-03-25 PROCEDURE — 63710000001 INSULIN LISPRO (HUMAN) PER 5 UNITS: Performed by: INTERNAL MEDICINE

## 2024-03-25 PROCEDURE — 80061 LIPID PANEL: CPT | Performed by: NURSE PRACTITIONER

## 2024-03-25 PROCEDURE — 25810000003 SODIUM CHLORIDE 0.9 % SOLUTION: Performed by: PHYSICIAN ASSISTANT

## 2024-03-25 PROCEDURE — 25010000002 POTASSIUM CHLORIDE PER 2 MEQ: Performed by: PHYSICIAN ASSISTANT

## 2024-03-25 PROCEDURE — 83721 ASSAY OF BLOOD LIPOPROTEIN: CPT | Performed by: NURSE PRACTITIONER

## 2024-03-25 PROCEDURE — G0108 DIAB MANAGE TRN  PER INDIV: HCPCS

## 2024-03-25 PROCEDURE — 83735 ASSAY OF MAGNESIUM: CPT | Performed by: PHYSICIAN ASSISTANT

## 2024-03-25 PROCEDURE — 25810000003 SODIUM CHLORIDE 0.9 % SOLUTION: Performed by: INTERNAL MEDICINE

## 2024-03-25 PROCEDURE — 99222 1ST HOSP IP/OBS MODERATE 55: CPT

## 2024-03-25 PROCEDURE — 63710000001 INSULIN LISPRO (HUMAN) PER 5 UNITS: Performed by: STUDENT IN AN ORGANIZED HEALTH CARE EDUCATION/TRAINING PROGRAM

## 2024-03-25 PROCEDURE — 25010000002 KCL (0.149%) IN NACL 20-0.9 MEQ/L-% SOLUTION: Performed by: PHYSICIAN ASSISTANT

## 2024-03-25 PROCEDURE — 99222 1ST HOSP IP/OBS MODERATE 55: CPT | Performed by: INTERNAL MEDICINE

## 2024-03-25 RX ORDER — NICOTINE POLACRILEX 4 MG
15 LOZENGE BUCCAL
Status: DISCONTINUED | OUTPATIENT
Start: 2024-03-25 | End: 2024-03-26 | Stop reason: HOSPADM

## 2024-03-25 RX ORDER — SERTRALINE HYDROCHLORIDE 25 MG/1
25 TABLET, FILM COATED ORAL NIGHTLY
Status: DISCONTINUED | OUTPATIENT
Start: 2024-03-25 | End: 2024-03-26 | Stop reason: HOSPADM

## 2024-03-25 RX ORDER — SODIUM CHLORIDE 0.9 % (FLUSH) 0.9 %
10 SYRINGE (ML) INJECTION AS NEEDED
Status: DISCONTINUED | OUTPATIENT
Start: 2024-03-25 | End: 2024-03-26 | Stop reason: HOSPADM

## 2024-03-25 RX ORDER — DEXTROSE MONOHYDRATE 25 G/50ML
25 INJECTION, SOLUTION INTRAVENOUS
Status: DISCONTINUED | OUTPATIENT
Start: 2024-03-25 | End: 2024-03-26 | Stop reason: HOSPADM

## 2024-03-25 RX ORDER — HYDROXYZINE HYDROCHLORIDE 25 MG/1
25 TABLET, FILM COATED ORAL 3 TIMES DAILY PRN
Status: DISCONTINUED | OUTPATIENT
Start: 2024-03-25 | End: 2024-03-26 | Stop reason: HOSPADM

## 2024-03-25 RX ORDER — BISACODYL 5 MG/1
5 TABLET, DELAYED RELEASE ORAL DAILY PRN
Status: DISCONTINUED | OUTPATIENT
Start: 2024-03-25 | End: 2024-03-26 | Stop reason: HOSPADM

## 2024-03-25 RX ORDER — SECUKINUMAB 300 MG/2ML
INJECTION SUBCUTANEOUS
COMMUNITY

## 2024-03-25 RX ORDER — SODIUM CHLORIDE 0.9 % (FLUSH) 0.9 %
10 SYRINGE (ML) INJECTION EVERY 12 HOURS SCHEDULED
Status: DISCONTINUED | OUTPATIENT
Start: 2024-03-25 | End: 2024-03-26 | Stop reason: HOSPADM

## 2024-03-25 RX ORDER — NICOTINE POLACRILEX 4 MG
15 LOZENGE BUCCAL
Status: DISCONTINUED | OUTPATIENT
Start: 2024-03-25 | End: 2024-03-25 | Stop reason: SDUPTHER

## 2024-03-25 RX ORDER — POLYETHYLENE GLYCOL 3350 17 G/17G
17 POWDER, FOR SOLUTION ORAL DAILY PRN
Status: DISCONTINUED | OUTPATIENT
Start: 2024-03-25 | End: 2024-03-26 | Stop reason: HOSPADM

## 2024-03-25 RX ORDER — INSULIN LISPRO 100 [IU]/ML
1-200 INJECTION, SOLUTION INTRAVENOUS; SUBCUTANEOUS
Status: DISCONTINUED | OUTPATIENT
Start: 2024-03-25 | End: 2024-03-25 | Stop reason: SDUPTHER

## 2024-03-25 RX ORDER — INSULIN LISPRO 100 [IU]/ML
2-9 INJECTION, SOLUTION INTRAVENOUS; SUBCUTANEOUS
Status: DISCONTINUED | OUTPATIENT
Start: 2024-03-25 | End: 2024-03-26 | Stop reason: HOSPADM

## 2024-03-25 RX ORDER — POTASSIUM CHLORIDE 20 MEQ/1
40 TABLET, EXTENDED RELEASE ORAL EVERY 4 HOURS
Status: COMPLETED | OUTPATIENT
Start: 2024-03-25 | End: 2024-03-25

## 2024-03-25 RX ORDER — AMOXICILLIN 250 MG
2 CAPSULE ORAL 2 TIMES DAILY PRN
Status: DISCONTINUED | OUTPATIENT
Start: 2024-03-25 | End: 2024-03-26 | Stop reason: HOSPADM

## 2024-03-25 RX ORDER — LISINOPRIL 10 MG/1
10 TABLET ORAL DAILY PRN
COMMUNITY

## 2024-03-25 RX ORDER — INSULIN LISPRO 100 [IU]/ML
10 INJECTION, SOLUTION INTRAVENOUS; SUBCUTANEOUS
Status: DISCONTINUED | OUTPATIENT
Start: 2024-03-25 | End: 2024-03-26

## 2024-03-25 RX ORDER — SODIUM CHLORIDE 9 MG/ML
40 INJECTION, SOLUTION INTRAVENOUS AS NEEDED
Status: DISCONTINUED | OUTPATIENT
Start: 2024-03-25 | End: 2024-03-25 | Stop reason: SDUPTHER

## 2024-03-25 RX ORDER — BISACODYL 10 MG
10 SUPPOSITORY, RECTAL RECTAL DAILY PRN
Status: DISCONTINUED | OUTPATIENT
Start: 2024-03-25 | End: 2024-03-26 | Stop reason: HOSPADM

## 2024-03-25 RX ORDER — INSULIN LISPRO 100 [IU]/ML
1-200 INJECTION, SOLUTION INTRAVENOUS; SUBCUTANEOUS AS NEEDED
Status: DISCONTINUED | OUTPATIENT
Start: 2024-03-25 | End: 2024-03-25 | Stop reason: SDUPTHER

## 2024-03-25 RX ORDER — IBUPROFEN 600 MG/1
1 TABLET ORAL
Status: DISCONTINUED | OUTPATIENT
Start: 2024-03-25 | End: 2024-03-26 | Stop reason: HOSPADM

## 2024-03-25 RX ORDER — DEXTROSE MONOHYDRATE 25 G/50ML
10-50 INJECTION, SOLUTION INTRAVENOUS
Status: DISCONTINUED | OUTPATIENT
Start: 2024-03-25 | End: 2024-03-25 | Stop reason: SDUPTHER

## 2024-03-25 RX ORDER — MIDAZOLAM HYDROCHLORIDE 1 MG/ML
INJECTION INTRAMUSCULAR; INTRAVENOUS
Status: ACTIVE
Start: 2024-03-25 | End: 2024-03-26

## 2024-03-25 RX ORDER — SODIUM CHLORIDE 9 MG/ML
125 INJECTION, SOLUTION INTRAVENOUS CONTINUOUS
Status: DISCONTINUED | OUTPATIENT
Start: 2024-03-25 | End: 2024-03-26

## 2024-03-25 RX ADMIN — INSULIN LISPRO 12 UNITS: 100 INJECTION, SOLUTION INTRAVENOUS; SUBCUTANEOUS at 13:19

## 2024-03-25 RX ADMIN — INSULIN LISPRO 7 UNITS: 100 INJECTION, SOLUTION INTRAVENOUS; SUBCUTANEOUS at 17:43

## 2024-03-25 RX ADMIN — SODIUM CHLORIDE 125 ML/HR: 9 INJECTION, SOLUTION INTRAVENOUS at 13:54

## 2024-03-25 RX ADMIN — Medication 10 ML: at 01:17

## 2024-03-25 RX ADMIN — POTASSIUM CHLORIDE AND SODIUM CHLORIDE 250 ML/HR: 450; 150 INJECTION, SOLUTION INTRAVENOUS at 05:22

## 2024-03-25 RX ADMIN — INSULIN LISPRO 10 UNITS: 100 INJECTION, SOLUTION INTRAVENOUS; SUBCUTANEOUS at 17:42

## 2024-03-25 RX ADMIN — SERTRALINE 25 MG: 25 TABLET, FILM COATED ORAL at 20:42

## 2024-03-25 RX ADMIN — INSULIN GLARGINE 58 UNITS: 100 INJECTION, SOLUTION SUBCUTANEOUS at 11:58

## 2024-03-25 RX ADMIN — POTASSIUM CHLORIDE 40 MEQ: 1500 TABLET, EXTENDED RELEASE ORAL at 13:19

## 2024-03-25 RX ADMIN — SODIUM CHLORIDE 1000 ML/HR: 9 INJECTION, SOLUTION INTRAVENOUS at 00:14

## 2024-03-25 RX ADMIN — Medication 10 ML: at 01:30

## 2024-03-25 RX ADMIN — Medication 10 ML: at 20:43

## 2024-03-25 RX ADMIN — SODIUM CHLORIDE AND POTASSIUM CHLORIDE 250 ML/HR: 9; 1.49 INJECTION, SOLUTION INTRAVENOUS at 01:09

## 2024-03-25 RX ADMIN — POTASSIUM CHLORIDE 40 MEQ: 1500 TABLET, EXTENDED RELEASE ORAL at 10:03

## 2024-03-25 RX ADMIN — Medication 10 ML: at 20:42

## 2024-03-25 RX ADMIN — POTASSIUM CHLORIDE, DEXTROSE MONOHYDRATE AND SODIUM CHLORIDE 150 ML/HR: 150; 5; 450 INJECTION, SOLUTION INTRAVENOUS at 06:02

## 2024-03-25 RX ADMIN — INSULIN HUMAN 9.3 UNITS/HR: 1 INJECTION, SOLUTION INTRAVENOUS at 00:22

## 2024-03-25 NOTE — CONSULTS
Inpatient Endocrine Consult  Endocrine consultation requested by hospitalist team for new diagnosis of uncontrolled type 2 diabetes  Patient Care Team:  Nigel Benavidez MD as PCP - General (Internal Medicine)    Chief Complaint: Uncontrolled type 2 diabetes new diagnosis    HPI: This is a 23-year-old male with history of psoriasis, hypertension, obesity came in with symptoms of panic attacks, polyuria, polydipsia, dry mouth and weight loss started about few weeks ago.  He noticed increased intake of sugary foods as well.  He was seen in the emergency room was found to have significant hyperglycemia with blood sugars more than 900 but he was not in DKA.  He subsequently admitted with nonketotic hyperosmolar state and has been on IV insulin with IV fluids.  Blood sugars have improved significantly and patient was able to carry on a conversation.    Past Medical History:   Diagnosis Date    Psoriasis        Social History     Socioeconomic History    Marital status: Single   Tobacco Use    Smoking status: Never    Smokeless tobacco: Never   Vaping Use    Vaping status: Never Used   Substance and Sexual Activity    Alcohol use: Never    Drug use: Never    Sexual activity: Defer       Family History   Problem Relation Age of Onset    Hypertension Mother     No Known Problems Father     Diabetes Maternal Grandmother     No Known Problems Maternal Grandfather     No Known Problems Paternal Grandmother     No Known Problems Paternal Grandfather        No Known Allergies    ROS:   Constitutional: Admit fatigue, tiredness.    Eyes:  Denies change in visual acuity   HENT:  Denies nasal congestion or sore throat   Respiratory: Denies cough, shortness of breath.   Cardiovascular:  Denies chest pain, edema   GI:  Denies abdominal pain, nausea, vomiting.   : Admit polyuria and polydipsia  Musculoskeletal:  Denies back pain or joint pain   Integument:  Denies dry skin, rash   Neurologic:  Denies headache, focal  weakness or sensory changes   Endocrine: Admit polyuria and polydipsia  Psychiatric: Admit anxiety and depression.      Vitals:    03/25/24 1200   BP: 125/77   Pulse: 74   Resp: 14   Temp: 97.7 °F (36.5 °C)   SpO2: 97%      Body mass index is 39.09 kg/m².     Physical Exam:  GEN: NAD, conversant, obese  EYES: EOMI, PERRL  NECK: no thyromegaly  CV: RRR  LUNG: CTA  SKIN: no rashes, no acanthosis  MSK: no deformities,   NEURO: no tremors, DTR normal  PSYCH: Awake and coherent      Results Review:     I reviewed the patient's new clinical results.    Lab Results   Component Value Date    GLUCOSE 214 (H) 03/25/2024    BUN 9 03/25/2024    CREATININE 0.68 (L) 03/25/2024    EGFRIFNONA >150 02/13/2022    BCR 13.2 03/25/2024    K 3.6 03/25/2024    CO2 26.0 03/25/2024    CALCIUM 9.1 03/25/2024    ALBUMIN 4.8 03/24/2024    AST 17 03/24/2024    ALT 53 (H) 03/24/2024       Lab Results   Component Value Date    HGBA1C 10.00 (H) 03/24/2024    HGBA1C 5.2 06/21/2021     Lab Results   Component Value Date    CREATININE 0.68 (L) 03/25/2024     Results from last 7 days   Lab Units 03/25/24  1256 03/25/24  1152 03/25/24  1059 03/25/24  1001 03/25/24  0908 03/25/24  0807   GLUCOSE mg/dL 149* 150* 159* 181* 190* 218*       Medication Review: Reviewed.       Current Facility-Administered Medications:     sennosides-docusate (PERICOLACE) 8.6-50 MG per tablet 2 tablet, 2 tablet, Oral, BID PRN **AND** polyethylene glycol (MIRALAX) packet 17 g, 17 g, Oral, Daily PRN **AND** bisacodyl (DULCOLAX) EC tablet 5 mg, 5 mg, Oral, Daily PRN **AND** bisacodyl (DULCOLAX) suppository 10 mg, 10 mg, Rectal, Daily PRN, Iris Villegas APRN    Calcium Replacement - Follow Nurse / BPA Driven Protocol, , Does not apply, PRN, Ramon Dao PA    dextrose (D50W) (25 g/50 mL) IV injection 10-50 mL, 10-50 mL, Intravenous, Q15 Min PRN, Sim Forte MD    dextrose (D50W) (25 g/50 mL) IV injection 25 g, 25 g, Intravenous, Q15 Min PRN, Iris Villegas APRN     dextrose (GLUTOSE) oral gel 15 g, 15 g, Oral, Q15 Min PRN, Sim Forte MD    Glucagon (GLUCAGEN) injection 1 mg, 1 mg, Intramuscular, Q15 Min PRN, Ramon Dao PA    hydrOXYzine (ATARAX) tablet 25 mg, 25 mg, Oral, TID PRN, Quinten Villegasa, APRN    insulin glargine (LANTUS, SEMGLEE) injection 1-200 Units, 1-200 Units, Subcutaneous, Nightly - Glucommander, Sim Forte MD, 58 Units at 03/25/24 1158    insulin lispro (HUMALOG/ADMELOG) injection 1-200 Units, 1-200 Units, Subcutaneous, 4x Daily With Meals & Nightly, Sim Forte MD, 12 Units at 03/25/24 1319    insulin lispro (HUMALOG/ADMELOG) injection 1-200 Units, 1-200 Units, Subcutaneous, PRN, Sim Forte MD    Magnesium Standard Dose Replacement - Follow Nurse / BPA Driven Protocol, , Does not apply, Feliberto GONZALES Ryan, PA    Phosphorus Replacement - Follow Nurse / BPA Driven Protocol, , Does not apply, Feliberto GONZALES Ryan, PA    Potassium Replacement - Follow Nurse / BPA Driven Protocol, , Does not apply, Feliberto GONZALES Ryan, PA    sertraline (ZOLOFT) tablet 25 mg, 25 mg, Oral, Nightly, Carmela Cali, LENI, APRN    [COMPLETED] Insert Peripheral IV, , , Once **AND** sodium chloride 0.9 % flush 10 mL, 10 mL, Intravenous, PRN, Ramon Dao PA    sodium chloride 0.9 % flush 10 mL, 10 mL, Intravenous, Q12H, Ramon Dao PA, 10 mL at 03/25/24 0117    sodium chloride 0.9 % flush 10 mL, 10 mL, Intravenous, PRN, Ramon Dao PA    sodium chloride 0.9 % flush 10 mL, 10 mL, Intravenous, Q12H, Quinten Villegasa, APRN, 10 mL at 03/25/24 0130    sodium chloride 0.9 % flush 10 mL, 10 mL, Intravenous, PRN, Dillon Villegasessa, APRN    sodium chloride 0.9 % infusion 40 mL, 40 mL, Intravenous, PRN, Ramon Dao PA          Assessment and plan:  Nonketotic hyperosmolar state: Much better blood sugars have improved, we will transition him to subcu insulins.    Diabetes mellitus type 2 with hyperglycemia: New diagnosis and uncontrolled, will start basal bolus insulin therapy with Lantus and  Humalog and get diabetes educators to see him.    Hypertension: Fair control    Morbid obesity: Will need to work on diet and activity as an outpatient.    Thank you very much for the consultation.      Shelly Orr MD FACE.

## 2024-03-25 NOTE — ED PROVIDER NOTES
Subjective   History of Present Illness      Patient is a 23-year-old male comes in complaining of severe anxiety for the past week.  Patient states that he has had intermittent symptoms feeling palpitations, feeling short of breath and feeling overwhelmed.  Patient states he had to call off work twice this week because of his anxiety being worse patient admits to having panic attacks.  Patient denies any SI or HI.  Patient denies any shortness of breath currently.  Patient denies any episodes of passing out.  Patient does report that he has been drinking lots of water recently feeling thirsty and urinating frequently last few days that is unusual for him.  Patient denies any fever, chills or vomiting.  Patient denies taking any medication for anxiety currently.  Patient spoke with his therapist 3 days ago as well.    Review of Systems   Constitutional:  Negative for chills, fatigue and fever.   HENT:  Negative for congestion, sore throat, tinnitus and trouble swallowing.    Eyes:  Negative for photophobia, discharge and visual disturbance.   Respiratory:  Negative for cough and shortness of breath.    Cardiovascular:  Negative for chest pain and leg swelling.   Gastrointestinal:  Negative for abdominal pain, blood in stool, diarrhea, nausea and vomiting.   Genitourinary:  Negative for dysuria, flank pain and urgency.   Musculoskeletal:  Negative for arthralgias and myalgias.   Skin:  Negative for rash.   Neurological:  Negative for dizziness and headaches.   Psychiatric/Behavioral:  Negative for behavioral problems, confusion, hallucinations, self-injury and suicidal ideas. The patient is nervous/anxious.        Past Medical History:   Diagnosis Date    Psoriasis        No Known Allergies    History reviewed. No pertinent surgical history.    Family History   Problem Relation Age of Onset    Hypertension Mother     No Known Problems Father     Diabetes Maternal Grandmother     No Known Problems Maternal Grandfather      No Known Problems Paternal Grandmother     No Known Problems Paternal Grandfather        Social History     Socioeconomic History    Marital status: Single   Tobacco Use    Smoking status: Never    Smokeless tobacco: Never   Vaping Use    Vaping status: Never Used   Substance and Sexual Activity    Alcohol use: Never    Drug use: Never    Sexual activity: Defer           Objective   Physical Exam  Vitals and nursing note reviewed.   Constitutional:       General: He is not in acute distress.     Appearance: He is well-developed. He is not diaphoretic.   HENT:      Head: Normocephalic and atraumatic.      Right Ear: External ear normal.      Left Ear: External ear normal.      Nose: Nose normal.      Mouth/Throat:      Mouth: Mucous membranes are moist.   Eyes:      Extraocular Movements: Extraocular movements intact.      Conjunctiva/sclera: Conjunctivae normal.      Pupils: Pupils are equal, round, and reactive to light.   Cardiovascular:      Rate and Rhythm: Normal rate and regular rhythm.      Pulses: Normal pulses.      Heart sounds: Normal heart sounds.      Comments: S1, S2 audible.  Pulmonary:      Effort: Pulmonary effort is normal. No respiratory distress.      Breath sounds: Normal breath sounds. No wheezing, rhonchi or rales.      Comments: On room air.  Abdominal:      General: Bowel sounds are normal. There is no distension.      Palpations: Abdomen is soft.      Tenderness: There is no abdominal tenderness. There is no guarding or rebound.   Musculoskeletal:         General: No tenderness or deformity. Normal range of motion.      Cervical back: Normal range of motion.   Skin:     General: Skin is warm.      Capillary Refill: Capillary refill takes less than 2 seconds.      Findings: No erythema or rash.   Neurological:      General: No focal deficit present.      Mental Status: He is alert and oriented to person, place, and time.      Cranial Nerves: No cranial nerve deficit.   Psychiatric:          Mood and Affect: Mood normal.         Behavior: Behavior normal.         Procedures           ED Course  ED Course as of 03/25/24 0316   Sun Mar 24, 2024   2145 Patient denies any SI or HI at this time [RL]   2151 Glucose over 600, lab work and IV fluids ordered. [RL]      ED Course User Index  [RL] Ramon Dao PA                                 Labs Reviewed   URINALYSIS W/ CULTURE IF INDICATED - Abnormal; Notable for the following components:       Result Value    Specific Gravity, UA 1.031 (*)     Glucose, UA >=1000 mg/dL (3+) (*)     Ketones, UA Trace (*)     All other components within normal limits    Narrative:     In absence of clinical symptoms, the presence of pyuria, bacteria, and/or nitrites on the urinalysis result does not correlate with infection.  Urine microscopic not indicated.   COMPREHENSIVE METABOLIC PANEL - Abnormal; Notable for the following components:    Glucose 973 (*)     Sodium 127 (*)     Chloride 87 (*)     CO2 21.0 (*)     Calcium 10.8 (*)     ALT (SGPT) 53 (*)     Alkaline Phosphatase 123 (*)     Anion Gap 19.0 (*)     All other components within normal limits    Narrative:     GFR Normal >60  Chronic Kidney Disease <60  Kidney Failure <15     BLOOD GAS, VENOUS - Abnormal; Notable for the following components:    pO2, Venous 44.4 (*)     O2 Saturation, Venous 78.2 (*)     All other components within normal limits   OSMOLALITY, SERUM - Abnormal; Notable for the following components:    Osmolality 329 (*)     All other components within normal limits   PHOSPHORUS - Abnormal; Notable for the following components:    Phosphorus 4.7 (*)     All other components within normal limits   HEMOGLOBIN A1C - Abnormal; Notable for the following components:    Hemoglobin A1C 10.00 (*)     All other components within normal limits   BASIC METABOLIC PANEL - Abnormal; Notable for the following components:    Glucose 570 (*)     Sodium 131 (*)     Chloride 93 (*)     CO2 21.0 (*)     Anion Gap 17.0  (*)     All other components within normal limits    Narrative:     GFR Normal >60  Chronic Kidney Disease <60  Kidney Failure <15     LIPID PANEL - Abnormal; Notable for the following components:    Total Cholesterol 284 (*)     Triglycerides 1,394 (*)     All other components within normal limits    Narrative:     Cholesterol Reference Ranges  (U.S. Department of Health and Human Services ATP III Classifications)    Desirable          <200 mg/dL  Borderline High    200-239 mg/dL  High Risk          >240 mg/dL      Triglyceride Reference Ranges  (U.S. Department of Health and Human Services ATP III Classifications)    Normal           <150 mg/dL  Borderline High  150-199 mg/dL  High             200-499 mg/dL  Very High        >500 mg/dL    HDL Reference Ranges  (U.S. Department of Health and Human Services ATP III Classifications)    Low     <40 mg/dl (major risk factor for CHD)  High    >60 mg/dl ('negative' risk factor for CHD)        LDL Reference Ranges  (U.S. Department of Health and Human Services ATP III Classifications)    Optimal          <100 mg/dL  Near Optimal     100-129 mg/dL  Borderline High  130-159 mg/dL  High             160-189 mg/dL  Very High        >189 mg/dL   POCT GLUCOSE FINGERSTICK - Abnormal; Notable for the following components:    Glucose >600 (*)     All other components within normal limits   POCT GLUCOSE FINGERSTICK - Abnormal; Notable for the following components:    Glucose >600 (*)     All other components within normal limits   POCT GLUCOSE FINGERSTICK - Abnormal; Notable for the following components:    Glucose 527 (*)     All other components within normal limits   POCT GLUCOSE FINGERSTICK - Abnormal; Notable for the following components:    Glucose 422 (*)     All other components within normal limits   POCT GLUCOSE FINGERSTICK - Abnormal; Notable for the following components:    Glucose 367 (*)     All other components within normal limits   SINGLE HS TROPONIN T - Normal     Narrative:     High Sensitive Troponin T Reference Range:  <14.0 ng/L- Negative Female for AMI  <22.0 ng/L- Negative Male for AMI  >=14 - Abnormal Female indicating possible myocardial injury.  >=22 - Abnormal Male indicating possible myocardial injury.   Clinicians would have to utilize clinical acumen, EKG, Troponin, and serial changes to determine if it is an Acute Myocardial Infarction or myocardial injury due to an underlying chronic condition.        CBC WITH AUTO DIFFERENTIAL - Normal   MAGNESIUM - Normal   MAGNESIUM - Normal   PHOSPHORUS - Normal   BASIC METABOLIC PANEL   MAGNESIUM   PHOSPHORUS   BASIC METABOLIC PANEL   MAGNESIUM   PHOSPHORUS   LDL CHOLESTEROL, DIRECT   CBC AND DIFFERENTIAL    Narrative:     The following orders were created for panel order CBC & Differential.  Procedure                               Abnormality         Status                     ---------                               -----------         ------                     CBC Auto Differential[[id number]]        Normal              Final result                 Please view results for these tests on the individual orders.                   Medical Decision Making  Problems Addressed:  Anxiety: complicated acute illness or injury  Hyperosmolar hyperglycemic state (HHS): complicated acute illness or injury    Amount and/or Complexity of Data Reviewed  Labs: ordered.  Radiology: ordered.  ECG/medicine tests: ordered.    Risk  OTC drugs.  Prescription drug management.  Decision regarding hospitalization.      Differential diagnosis: anxiety, arrhythmia, hyperglycemia, not meant to be an all inclusive list.  Chart review: Upon chart review, Patient follows with [facility] for latent tuberculosis infection.  Patient is on Skyrizi for psoriatic arthritis. He had positive QTB Gold tests on 3/17 and 3/23/2023, and subsequent CXR (4/4/2023) was unremarkable. He denied any known family h/o or exposure to tuberculosis. He denied  "any h/o incarceration. He denied ever living or volunteering in a homeless shelter. He denied any travel outside [place].  Nonadherent to initially prescribed course of rifampin. Now back on rifampin and has completed nearly 4-months of LTBI therapy (9/16/2023-Present). He reports that he has ~1 week remaining  - Re-counseled patient re: need to complete LTBI treatment. Also re-counseled patient on need for adherence to therapy and risk of adverse effects (such as nausea, vomiting, hepatotoxicity, myelosuppression, and discolored body fluids)  - Tolerating rifampin well. Thus, will continue rifampin 600mg PO daily to complete 4-month course of treatment. LTBI therapy will reduce his risk of reactivation tuberculosis to negligible. Notably, PPD and IGRA tests (ie QTB Gold) would be expected to remain positive despite treatment (there is no test of cure)  - No contraindications for ongoing immunosuppression from ID standpoint, but notably, Otezla efficacy is greatly reduced while on concurrent rifampin (notably, he will complete rifampin in ~1 week). Mgmt per Dr. Walter (Rheumatology)     EKG:  EKG independently interpreted by myself shows sinus rhythm 94 bpm, no ST elevation apparent.  Findings confirmed by Dr. Santiago.    Imaging: If applicable see my interpretation above.  XR Chest 1 View   Final Result   Impression:   No acute cardiopulmonary abnormality.            Electronically Signed: Connor Vidal MD     3/25/2024 12:29 AM EDT     Workstation ID: [id number]        Labs:  Lab work independently interpreted by myself shows CBC shows no acute findings.  UA shows trace ketones otherwise unremarkable.  Initial troponin negative.  VBG shows normal pH.  Glucose over 900.  Sodium low at 127, corrected sodium for hyperglycemia is normal.  Serum Osmo elevated at 329.     Vitals:  /96 (BP Location: Right arm, Patient Position: Lying)   Pulse 106   Temp 97.8 °F (36.6 °C) (Oral)   Resp 16   Ht 188 cm (74\")   Wt (!) " 138 kg (304 lb 7.3 oz)   SpO2 96%   BMI 39.09 kg/m²    Medications given:    Medications   sodium chloride 0.9 % flush 10 mL (has no administration in time range)   sodium chloride 0.9 % flush 10 mL (10 mL Intravenous Given 3/25/24 0117)   sodium chloride 0.9 % flush 10 mL (has no administration in time range)   sodium chloride 0.9 % infusion 40 mL (has no administration in time range)   dextrose (GLUTOSE) oral gel 15 g (has no administration in time range)   dextrose (D50W) (25 g/50 mL) IV injection 10-50 mL (has no administration in time range)   Glucagon (GLUCAGEN) injection 1 mg (has no administration in time range)   sodium chloride 0.9 % bolus (0 mL/hr Intravenous Stopped 3/25/24 0117)   sodium chloride 0.9 % infusion (has no administration in time range)   sodium chloride 0.9 % with KCl 20 mEq/L infusion (250 mL/hr Intravenous New Bag 3/25/24 0109)   sodium chloride 0.9 % with KCl 40 mEq/L infusion (has no administration in time range)   dextrose 5 % and sodium chloride 0.9 % infusion (has no administration in time range)   dextrose 5 % and sodium chloride 0.9 % with KCl 20 mEq/L infusion (has no administration in time range)   dextrose 5 % and sodium chloride 0.9 % with KCl 40 mEq/L infusion (has no administration in time range)   sodium chloride 0.45 % infusion (has no administration in time range)   sodium chloride 0.45 % with KCl 20 mEq/L infusion (has no administration in time range)   sodium chloride 0.45 % 1,000 mL with potassium chloride 40 mEq infusion (has no administration in time range)   dextrose 5 % and sodium chloride 0.45 % infusion (has no administration in time range)   dextrose 5 % and sodium chloride 0.45 % with KCl 20 mEq/L infusion (has no administration in time range)   dextrose 5 % and sodium chloride 0.45 % with KCl 40 mEq/L infusion (has no administration in time range)   insulin regular 1 unit/mL in 0.9% sodium chloride (Glucommander) (7.7 Units/hr Intravenous Rate Change (DUAL  SIGN) 3/25/24 0243)   Potassium Replacement - Follow Nurse / BPA Driven Protocol (has no administration in time range)   Magnesium Standard Dose Replacement - Follow Nurse / BPA Driven Protocol (has no administration in time range)   Phosphorus Replacement - Follow Nurse / BPA Driven Protocol (has no administration in time range)   Calcium Replacement - Follow Nurse / BPA Driven Protocol (has no administration in time range)   dextrose (D50W) (25 g/50 mL) IV injection 25 g (has no administration in time range)   hydrOXYzine (ATARAX) tablet 25 mg (has no administration in time range)   sodium chloride 0.9 % flush 10 mL (10 mL Intravenous Given 3/25/24 0130)   sodium chloride 0.9 % flush 10 mL (has no administration in time range)   sennosides-docusate (PERICOLACE) 8.6-50 MG per tablet 2 tablet (has no administration in time range)     And   polyethylene glycol (MIRALAX) packet 17 g (has no administration in time range)     And   bisacodyl (DULCOLAX) EC tablet 5 mg (has no administration in time range)     And   bisacodyl (DULCOLAX) suppository 10 mg (has no administration in time range)   sodium chloride 0.9 % bolus 2,000 mL (0 mL Intravenous Stopped 3/25/24 0010)   hydrOXYzine (ATARAX) tablet 25 mg (25 mg Oral Given 3/24/24 2212)       Procedures:  not indicated  MDM: Patient is a 23-year-old male comes in complaining of anxiety.  Patient also reports some polyuria and polydipsia over the past few days.  Lab work independently interpreted by myself shows CBC shows no acute findings.  UA shows trace ketones otherwise unremarkable.  Initial troponin negative.  VBG shows normal pH.  Glucose over 900.  Sodium low at 127, corrected sodium for hyperglycemia is normal.  Serum Osmo elevated at 329.  I am concerned for HHS in this patient.  Patient was given 2 L of normal saline in ED as well as Atarax.  Patient was placed on Glucomander protocol for HHS.  I suspect new onset diabetes for patient.  Spoke with ALEXIA Simmons,  excepted patient behalf hospitalist team for admission hospital for further workup and treatment on behalf of .  Patient denies any SI or HI.Based on the clinical findings at this time I anticipate the patient will require a 2 midnight stay. Plan discussed with patient and is agreeable with plan.      Final diagnoses:   Anxiety   Hyperosmolar hyperglycemic state (HHS)       ED Disposition  ED Disposition       ED Disposition   Decision to Admit    Condition   --    Comment   Level of Care: Med/Surg [1]   Diagnosis: Newly diagnosed diabetes [367945]   Admitting Physician: OLIMPIA CAPONE [[id number]]   Attending Physician: OLIMPIA CAPONE [[id number]]   Certification: I Certify That Inpatient Hospital Services Are Medically Necessary For Greater Than 2 Midnights                 No follow-up provider specified.       Medication List        Stop      hydrOXYzine 25 MG tablet  Commonly known as: ATARAX            ASK your doctor about these medications      lisinopril 10 MG tablet  Commonly known as: PRINIVIL,ZESTRIL  Ask about: Which instructions should I use?                 Ramon Dao PA  03/25/24 0316

## 2024-03-25 NOTE — CASE MANAGEMENT/SOCIAL WORK
Discharge Planning Assessment   [facility]     Patient Name: Seth Wild  MRN: [id number]  Today's Date: 3/25/2024    Admit Date: 3/24/2024    Plan: DC Plan: Anticipate routine home alone.   Discharge Needs Assessment       Row Name 03/25/24 1537       Living Environment    People in Home alone    Current Living Arrangements home    Potentially Unsafe Housing Conditions none    In the past 12 months has the electric, gas, oil, or water company threatened to shut off services in your home? No    Primary Care Provided by self    Provides Primary Care For no one    Family Caregiver if Needed grandparent(s)    Family Caregiver Names Maikel Wild    Quality of Family Relationships helpful;involved;supportive    Able to Return to Prior Arrangements yes       Resource/Environmental Concerns    Resource/Environmental Concerns none    Transportation Concerns none       Transportation Needs    In the past 12 months, has lack of transportation kept you from medical appointments or from getting medications? no    In the past 12 months, has lack of transportation kept you from meetings, work, or from getting things needed for daily living? No       Food Insecurity    Within the past 12 months, you worried that your food would run out before you got the money to buy more. Never true    Within the past 12 months, the food you bought just didn't last and you didn't have money to get more. Never true       Transition Planning    Patient/Family Anticipates Transition to home    Patient/Family Anticipated Services at Transition none    Transportation Anticipated car, drives self       Discharge Needs Assessment    Readmission Within the Last 30 Days no previous admission in last 30 days    Equipment Currently Used at Home none    Concerns to be Addressed no discharge needs identified;denies needs/concerns at this time    Anticipated Changes Related to Illness none    Equipment Needed After Discharge none    Provided Post  Acute Provider List? N/A    Provided Post Acute Provider Quality & Resource List? N/A    Current Discharge Risk lives alone                   Discharge Plan       Row Name 03/25/24 1538       Plan    Plan DC Plan: Anticipate routine home alone.    Patient/Family in Agreement with Plan yes    Provided Post Acute Provider List? N/A    Provided Post Acute Provider Quality & Resource List? N/A    Plan Comments CM spoke with patient at bedside to discuss admission assessment and discharge planning. Patient confirms PCP and pharmacy.Patient is already enrolled in meds to bed program.  Patient denies any difficulty affording medications at this time. Patient denies any additional needs for services or DME at this time. Patient reports IADL's and he will drive himself home when ready for DC. CM will continue to follow for any further needs and adjust discharge plan accordingly. DC Barriers: Insulin gtt and glucose control.               Expected Discharge Date and Time       Expected Discharge Date Expected Discharge Time    Mar 27, 2024            Demographic Summary       Row Name 03/25/24 1537       General Information    Admission Type inpatient    Arrived From emergency department;home    Referral Source admission list    Reason for Consult discharge planning    Preferred Language English       Contact Information    Permission Granted to Share Info With                   Functional Status       Row Name 03/25/24 1537       Functional Status    Usual Activity Tolerance excellent    Current Activity Tolerance excellent       Physical Activity    On average, how many days per week do you engage in moderate to strenuous exercise (like a brisk walk)? 0 days    On average, how many minutes do you engage in exercise at this level? 0 min    Number of minutes of exercise per week 0       Functional Status, IADL    Medications independent    Meal Preparation independent    Housekeeping independent    Laundry  independent    Shopping independent       Mental Status    General Appearance WDL WDL       Mental Status Summary    Recent Changes in Mental Status/Cognitive Functioning no changes       Employment/    Employment Status employed full-time    Current or Previous Occupation desk job           Current or Previous  Service none               Met with patient in room   Maintain distance greater than six feet and spent less than fifteen minutes in the room.    Jena Whitfield RN     Office Phone: [phone]  Office Cell:     [phone]

## 2024-03-25 NOTE — H&P
"[facility] Medicine Services    Hospitalist History and Physical     Seth Wild : 2000 MRN:[id number] LOS:0 ROOM: 2301/1     Reason for admission: Newly diagnosed diabetes     Assessment / Plan     Newly diagnosed diabetes  HHS  - placed on insulin drip  - A1C 10.0   - endocrinology consult  - Diabetes educator consult  - transition to subcutaneous insulin when appropriate    Anxiety, uncontrolled  Possible underlying depression  - pt reports panic attacks along with inability to concentrate, sleep and having \"negative thoughts\"  - psych consult   -pt reports adverse affect of Lexapro in past  - Has OP therapist but not able to prescribe medication  - atarax prn     HTN  - on lisinopril but pt reports does not take routine   -monitor BP and provide prn for hypertension    Hx psoriasis  Elevated liver enzymes  - previously on Cimzia with complication of elevated liver enzymes  - completed rifampin for latent TB end    -off rinvoq as of 24. Off skyrizi   - now only on cosentyx  - Followed by GI as outpatient for elevated liver enzymes that is suspect 2/2 to DAWKINS vs side affect of medication  -ALT 53 and AST 17  - resume home medication when able to take po     Obesity  - BMI 39.26  - recommend diet and lifestyle change    Nutrition:   NPO Diet NPO Type: Strict NPO     DVT prophylaxis:  Mechanical DVT prophylaxis orders are present.         History of Present illness     Seth Wild is a 23 y.o. male with PMH hypertension, psoriasis, elevated liver enzymes, latent TB and obesity presented to the hospital and was admitted on 3/25/2024 with a principal diagnosis of Newly diagnosed diabetes.     Patient initially come to the ED for uncontrolled anxiety.  He reports over the past couple weeks has increased sensation of generalized anxiety.  Unable to go to work.  Having panic attacks.  Patient reports having \"bad thoughts\" unable to sleep, unable to concentrate, and increased " "intake of sugary foods.  Denies thoughts of harming self or others.  Patient had also reported to ED physician having dry mouth, polyuria and polydipsia.  Reports generalized body aches.    Patient was given 50 of Atarax for anxiety.  Further labs revealed blood glucose of 973.  Patient showing signs of HHS with sodium 127, chloride 87, CO2 21 anion gap of 19.  Negative for acidosis.  A1c of 10.  Osmolality 329.  Negative for signs of infection.  He was started on insulin drip.  Will consult endocrinology and diabetic educator for new diagnosis of diabetes and will consult psychiatry for uncontrolled anxiety.  Patient been admitted for further treatment    Patient was seen and examined on 03/25/24 at 00:59 EDT .    Subjective / Review of systems     Review of Systems   Endocrine: Positive for polydipsia, polyphagia and polyuria.   Musculoskeletal:         Generalized \"body aches\"   Psychiatric/Behavioral:  Positive for decreased concentration and sleep disturbance. The patient is nervous/anxious.           Past Medical/Surgical/Social/Family History & Allergies     Past Medical History:   Diagnosis Date    Psoriasis       History reviewed. No pertinent surgical history.   Social History     Socioeconomic History    Marital status: Single   Tobacco Use    Smoking status: Never    Smokeless tobacco: Never   Vaping Use    Vaping status: Never Used   Substance and Sexual Activity    Alcohol use: Never    Drug use: Never    Sexual activity: Not Currently      Family History   Problem Relation Age of Onset    Diabetes Maternal Grandmother     No Known Problems Mother     No Known Problems Father     No Known Problems Maternal Grandfather     No Known Problems Paternal Grandmother     No Known Problems Paternal Grandfather       No Known Allergies   Social Determinants of Health     Tobacco Use: Low Risk  (3/24/2024)    Patient History     Smoking Tobacco Use: Never     Smokeless Tobacco Use: Never     Passive Exposure: " Not on file   Alcohol Use: Not At Risk (6/21/2021)    AUDIT-C     Frequency of Alcohol Consumption: Never     Average Number of Drinks: Not on file     Frequency of Binge Drinking: Not on file   Financial Resource Strain: Not on file   Food Insecurity: Not on file   Transportation Needs: Not on file   Physical Activity: Not on file   Stress: Not on file   Social Connections: Unknown (10/12/2023)    Family and Community Support     Help with Day-to-Day Activities: Not on file     Lonely or Isolated: Not on file   Interpersonal Safety: Unknown (10/12/2023)    Abuse Screen     Unsafe at Home or Work/School: Not on file     Feels Threatened by Someone?: Not on file     Does Anyone Keep You from Contacting Others or Doint Things Outside the Home?: Not on file     Physical Sign of Abuse Present: Not on file   Depression: At risk (10/5/2022)    PHQ-2     PHQ-2 Score: 11   Housing Stability: Unknown (10/12/2023)    Housing Stability     Current Living Arrangements: Not on file     Potentially Unsafe Housing Conditions: Not on file   Utilities: Not on file   Health Literacy: Unknown (10/12/2023)    Education     Help with school or training?: Not on file     Preferred Language: Not on file   Employment: Unknown (10/12/2023)    Employment     Do you want help finding or keeping work or a job?: Not on file   Disabilities: Unknown (10/12/2023)    Disabilities     Concentrating, Remembering, or Making Decisions Difficulty: Not on file     Doing Errands Independently Difficulty: Not on file        Home Medications     Prior to Admission medications    Medication Sig Start Date End Date Taking? Authorizing Provider   hydrOXYzine (ATARAX) 25 MG tablet Take 1 tablet by mouth 3 (Three) Times a Day As Needed for Anxiety. 3/24/24  Yes Ramon Dao PA   betamethasone dipropionate 0.05 % cream Apply 1 application topically to the appropriate area as directed 2 (Two) Times a Day. 10/5/22   Nigel Benavidez MD   calcipotriene  (DOVONOX) 0.005 % ointment Apply 1 application topically to the appropriate area as directed 2 (Two) Times a Day. 10/5/22   Nigel Benavidez MD   lisinopril (PRINIVIL,ZESTRIL) 10 MG tablet Take 1 tablet by mouth Daily. 12/27/23   Nigel Benavidez MD   propranolol LA (Inderal LA) 60 MG 24 hr capsule Take 1 capsule by mouth Daily. 2/7/22   Nigel Benavidez MD        Objective / Physical Exam     Vital signs:  Temp: 98.6 °F (37 °C)  BP: 157/93  Heart Rate: 100  Resp: 16  SpO2: 95 %  Weight: (!) 139 kg (305 lb 12.5 oz)    Admission Weight: Weight: (!) 139 kg (305 lb 12.5 oz)    Physical Exam  Constitutional:       Appearance: He is obese.   Eyes:      Pupils: Pupils are equal, round, and reactive to light.   Cardiovascular:      Rate and Rhythm: Regular rhythm. Tachycardia present.   Pulmonary:      Effort: Pulmonary effort is normal.      Breath sounds: Normal breath sounds.   Abdominal:      Palpations: Abdomen is soft.   Musculoskeletal:         General: Normal range of motion.   Neurological:      Mental Status: He is alert and oriented to person, place, and time.            Labs     Results from last 7 days   Lab Units 03/24/24  2204   WBC 10*3/mm3 7.76   HEMOGLOBIN g/dL 16.2   HEMATOCRIT % 47.6   PLATELETS 10*3/mm3 325      Results from last 7 days   Lab Units 03/24/24  2204   ALK PHOS U/L 123*   AST (SGOT) U/L 17   ALT (SGPT) U/L 53*           Results from last 7 days   Lab Units 03/24/24  2204   SODIUM mmol/L 127*   POTASSIUM mmol/L 4.6   CHLORIDE mmol/L 87*   CO2 mmol/L 21.0*   BUN mg/dL 13   CREATININE mg/dL 1.08   GLUCOSE mg/dL 973*        Imaging     XR Chest 1 View    Result Date: 3/25/2024  XR CHEST 1 VW Date of Exam: 3/24/2024 11:55 PM EDT Indication: Assess for Fluid Overload Assess for Fluid Overload Comparison: 4/4/2023. Findings: There is no pneumothorax, pleural effusion or focal airspace consolidation. Heart size and pulmonary vasculature appear within normal limits. Regional bones  appear intact.     Impression: No acute cardiopulmonary abnormality. Electronically Signed: Connor Vidal MD  3/25/2024 12:29 AM EDT  Workstation ID: [id number]          Current Medications     Scheduled Meds:  sodium chloride, 10 mL, Intravenous, Q12H  sodium chloride, 10 mL, Intravenous, Q12H         Continuous Infusions:  dextrose 5 % and sodium chloride 0.45 %, 150 mL/hr  dextrose 5 % and sodium chloride 0.45 % with KCl 20 mEq/L, 150 mL/hr  dextrose 5 % and sodium chloride 0.45 % with KCl 40 mEq/L, 150 mL/hr  dextrose 5 % and sodium chloride 0.9 %, 150 mL/hr  dextrose 5 % and sodium chloride 0.9 % with KCl 20 mEq, 150 mL/hr  dextrose 5% and sodium chloride 0.9% with KCl 40 mEq/L, 150 mL/hr  insulin, 0-100 Units/hr, Last Rate: 9.3 Units/hr (03/25/24 0022)  sodium chloride 0.45 % 1,000 mL with potassium chloride 40 mEq infusion, 250 mL/hr  sodium chloride, 250 mL/hr  sodium chloride 0.45 % with KCl 20 mEq, 250 mL/hr  sodium chloride, 1,000 mL/hr, Last Rate: 1,000 mL/hr (03/25/24 0014)  sodium chloride, 250 mL/hr  sodium chloride 0.9 % with KCl 20 mEq, 250 mL/hr  sodium chloride 0.9 % with KCl 40 mEq/L, 250 mL/hr           Iris Villegas [facility] Medicine  03/25/24   00:59 EDT

## 2024-03-25 NOTE — PROGRESS NOTES
[facility] MEDICINE SERVICE  DAILY PROGRESS NOTE    NAME: Seth Wild  : 2000  MRN: [id number]      LOS: 0 days     PROVIDER OF SERVICE: Sim Forte MD    Chief Complaint: Newly diagnosed diabetes  Seth Wild is a 23 y.o. male with PMH hypertension, psoriasis, elevated liver enzymes, latent TB and obesity presented to the hospital and was admitted on 3/25/2024 with a principal diagnosis of Newly diagnosed diabetes.    Subjective:     Interval History:  History taken from: patient  Patient Complaints: no new complaints   Patient Denies:  Nausea or vomiting ; no fever     Review of Systems:   Review of Systems  14 point review of system unremarkable except mentioned above  Objective:     Vital Signs  Temp:  [97.8 °F (36.6 °C)-98.6 °F (37 °C)] 98 °F (36.7 °C)  Heart Rate:  [] 81  Resp:  [14-16] 14  BP: (108-174)/() 108/79   Body mass index is 39.09 kg/m².    Physical Exam  Physical Exam  HENT:      Mouth/Throat:      Mouth: Mucous membranes are moist.   Eyes:      Extraocular Movements: Extraocular movements intact.      Conjunctiva/sclera: Conjunctivae normal.   Cardiovascular:      Rate and Rhythm: Normal rate.   Pulmonary:      Effort: Pulmonary effort is normal.   Abdominal:      General: Bowel sounds are normal.      Palpations: Abdomen is soft.   Musculoskeletal:         General: Normal range of motion.      Cervical back: Neck supple.   Skin:     General: Skin is warm.      Comments: Elevated plaque like lesion on the forehead    Neurological:      General: No focal deficit present.      Mental Status: He is alert and oriented to person, place, and time.   Psychiatric:         Mood and Affect: Mood normal.         Scheduled Meds   sodium chloride, 10 mL, Intravenous, Q12H  sodium chloride, 10 mL, Intravenous, Q12H       PRN Meds     senna-docusate sodium **AND** polyethylene glycol **AND** bisacodyl **AND** bisacodyl    Calcium Replacement - Follow Nurse / BPA Driven  Protocol    dextrose    dextrose    dextrose    dextrose 5 % and sodium chloride 0.45 %    dextrose 5 % and sodium chloride 0.45 % with KCl 20 mEq/L    dextrose 5 % and sodium chloride 0.45 % with KCl 40 mEq/L    dextrose 5 % and sodium chloride 0.9 %    dextrose 5 % and sodium chloride 0.9 % with KCl 20 mEq    dextrose 5% and sodium chloride 0.9% with KCl 40 mEq/L    glucagon (human recombinant)    hydrOXYzine    Magnesium Standard Dose Replacement - Follow Nurse / BPA Driven Protocol    Phosphorus Replacement - Follow Nurse / BPA Driven Protocol    Potassium Replacement - Follow Nurse / BPA Driven Protocol    sodium chloride 0.45 % 1,000 mL with potassium chloride 40 mEq infusion    sodium chloride    sodium chloride 0.45 % with KCl 20 mEq    [COMPLETED] Insert Peripheral IV **AND** sodium chloride    sodium chloride    sodium chloride    sodium chloride    sodium chloride    sodium chloride 0.9 % with KCl 20 mEq    sodium chloride 0.9 % with KCl 40 mEq/L   Infusions  dextrose 5 % and sodium chloride 0.45 %, 150 mL/hr  dextrose 5 % and sodium chloride 0.45 % with KCl 20 mEq/L, 150 mL/hr, Last Rate: 150 mL/hr (03/25/24 0602)  dextrose 5 % and sodium chloride 0.45 % with KCl 40 mEq/L, 150 mL/hr  dextrose 5 % and sodium chloride 0.9 %, 150 mL/hr  dextrose 5 % and sodium chloride 0.9 % with KCl 20 mEq, 150 mL/hr  dextrose 5% and sodium chloride 0.9% with KCl 40 mEq/L, 150 mL/hr  insulin, 0-100 Units/hr, Last Rate: 9.6 Units/hr (03/25/24 0808)  sodium chloride 0.45 % 1,000 mL with potassium chloride 40 mEq infusion, 250 mL/hr  sodium chloride, 250 mL/hr  sodium chloride 0.45 % with KCl 20 mEq, 250 mL/hr, Last Rate: Stopped (03/25/24 0602)  sodium chloride, 250 mL/hr  sodium chloride 0.9 % with KCl 20 mEq, 250 mL/hr, Last Rate: 250 mL/hr (03/25/24 0109)  sodium chloride 0.9 % with KCl 40 mEq/L, 250 mL/hr          Diagnostic Data    Results from last 7 days   Lab Units 03/25/24  0359 03/25/24  0022 03/24/24  2204   WBC  "10*3/mm3  --   --  7.76   HEMOGLOBIN g/dL  --   --  16.2   HEMATOCRIT %  --   --  47.6   PLATELETS 10*3/mm3  --   --  325   GLUCOSE mg/dL 291*   < > 973*   CREATININE mg/dL 0.78   < > 1.08   BUN mg/dL 10   < > 13   SODIUM mmol/L 141   < > 127*   POTASSIUM mmol/L 4.1   < > 4.6   AST (SGOT) U/L  --   --  17   ALT (SGPT) U/L  --   --  53*   ALK PHOS U/L  --   --  123*   BILIRUBIN mg/dL  --   --  0.6   ANION GAP mmol/L 13.0   < > 19.0*    < > = values in this interval not displayed.       XR Chest 1 View    Result Date: 3/25/2024  Impression: No acute cardiopulmonary abnormality. Electronically Signed: Connor Vidal MD  3/25/2024 12:29 AM EDT  Workstation ID: [id number]       I reviewed the patient's new clinical results.    Assessment/Plan:     Active and Resolved Problems  #Nonketotic hyperosmolar state  #Newly diagnosed diabetes  - Hemoglobin A1c 10  - s/p  Insulin drip, transition to subcu Premeal and long-acting insulin  - Endocrinology team has been consulted-tammyreicatre input   -diabetic educator consult      #Anxiety, uncontrolled  Possible underlying depression  - pt reports panic attacks along with inability to concentrate, sleep and having \"negative thoughts\"  - psych consult appreciated started on sertraline 25 mg nightly and follow-up with behavioral team as outpatient  -pt reports adverse affect of Lexapro in past  - Has OP therapist but not able to prescribe medication  - atarax prn      #Essential HTN  - on lisinopril but pt reports does not take routine   -monitor BP and provide prn for hypertension     #Hx psoriasis  Elevated liver enzymes  - previously on Cimzia with complication of elevated liver enzymes  - completed rifampin for latent TB end of January   -off rinvoq as of 2/5/24. Off skyrizi   - now only on cosentyx  - Followed by GI as outpatient for elevated liver enzymes that is suspect 2/2 to DAWKINS vs side affect of medication  -ALT 53 and AST 17  - resume home medication when able to take po    "   #Obesity  - BMI 39.26  - diet and lifestyle change  -out pt f/u  with pcp for consideration of bariatric surgery         DVT prophylaxis:  Mechanical DVT prophylaxis orders are present.         Code status is   Code Status and Medical Interventions:   Ordered at: 03/25/24 0049     Code Status (Patient has no pulse and is not breathing):    CPR (Attempt to Resuscitate)     Medical Interventions (Patient has pulse or is breathing):    Full Support       Plan for disposition:home  in 1-2 days    Time: 34 minutes    Signature: Electronically signed by Sim Forte MD, 03/25/24, 08:39 EDT.  [facility] Hospitalist Team

## 2024-03-25 NOTE — ACP (ADVANCE CARE PLANNING)
Social Work Assessment   [facility]     Patient Name: Seth Wild  MRN: [id number]  Today's Date: 3/25/2024    Admit Date: 3/24/2024         Demographic Summary       Row Name 03/25/24 1328       General Information    Reason for Consult decision-making    General Information Comments SW reviewed chart and noted pt's grandfather is the only listed contact. SW met with pt at bedside in room 2301 to inquire further. Pt reports being single and provided contact information for his mother. Contacts updated. According to [id number] pt's mother is pt's legal NOK. Pt. denies further needs at this time.             Judie Garcias, JULIANNW, MSSW   [facility]     Ph: [phone]  F: [phone]

## 2024-03-25 NOTE — ED NOTES
Nursing report ED to floor  Seth Wild  23 y.o.  male    HPI:   Chief Complaint   Patient presents with    Psychiatric Evaluation     Pt reports mental stress and increased anxiety over the past week.  Pt reports he had a panic attack last week.  Pt denies any SI/HI.  Pt also reports dry mouth, increased thirst and increased urinary frequency.         Admitting doctor:   Sandra Hurd MD    Admitting diagnosis:   The primary encounter diagnosis was Anxiety. A diagnosis of Hyperosmolar hyperglycemic state (HHS) was also pertinent to this visit.    Code status:   Current Code Status       Date Active Code Status Order ID Comments User Context       Not on file            Allergies:   Patient has no known allergies.    Isolation:  No active isolations     Fall Risk:  Fall Risk Assessment was completed, and patient is at low risk for falls.   Predictive Model Details         1 (Low) Factor Value    Calculated 3/25/2024 00:43 Musculoskeletal Assessment WDL    Risk of Fall Model Active Peripheral IV Present     Imaging order in this encounter Present     Skin Assessment WDL     Hemoglobin A1c 10 %     Magnesium not on file     Age 23     Chloride 87 mmol/L     Number of Distinct Medication Classes administered 3     Drug Use No     [place] Scale not on file     Financial Class Private Insurance     Peripheral Vascular Assessment WDL     Clinically Relevant Sex Not Female     ALT 53 U/L     Gastrointestinal Assessment WDL     Total Bilirubin 0.6 mg/dL     Cardiac Assessment WDL     Diastolic BP 93     Respiratory Rate 16     Potassium 4.6 mmol/L     Days after Admission 0.137     Calcium 10.8 mg/dL     Creatinine 1.08 mg/dL     Albumin 4.8 g/dL         Weight:       03/24/24 2124   Weight: (!) 139 kg (305 lb 12.5 oz)       Intake and Output    Intake/Output Summary (Last 24 hours) at 3/25/2024 0043  Last data filed at 3/24/2024 2300  Gross per 24 hour   Intake --   Output 1800 ml   Net -1800 ml       Diet:    Dietary Orders (From admission, onward)       Start     Ordered    03/24/24 2353  Patient is on Glucommander  Continuous        Question Answer Comment   Tray Note: Glucommander    Patient is on Glucommander: Yes        03/24/24 2353 03/24/24 2353  NPO Diet NPO Type: Strict NPO  Diet Effective Now        Question:  NPO Type  Answer:  Strict NPO    03/24/24 2353                     Most recent vitals:   Vitals:    03/24/24 2210 03/24/24 2232 03/24/24 2241 03/24/24 2332   BP: (!) 155/108 169/100  157/93   BP Location:       Patient Position:       Pulse: 107 108 96 100   Resp:       Temp:       TempSrc:       SpO2: 93% 95% 94% 95%   Weight:       Height:           Active LDAs/IV Access:   Lines, Drains & Airways       Active LDAs       Name Placement date Placement time Site Days    Peripheral IV 02/13/22 1920 Left Forearm 02/13/22 1920  Forearm  770    Peripheral IV 03/24/24 2205 Anterior;Distal;Left;Upper Arm 03/24/24 2205  Arm  less than 1    Peripheral IV 03/25/24 0011 Distal;Posterior;Right Forearm 03/25/24  0011  Forearm  less than 1                    Skin Condition:   Skin Assessments (last day)       Date/Time Skin WDL    03/24/24 22:29:06 WDL             Labs (abnormal labs have a star):   Labs Reviewed   URINALYSIS W/ CULTURE IF INDICATED - Abnormal; Notable for the following components:       Result Value    Specific Gravity, UA 1.031 (*)     Glucose, UA >=1000 mg/dL (3+) (*)     Ketones, UA Trace (*)     All other components within normal limits    Narrative:     In absence of clinical symptoms, the presence of pyuria, bacteria, and/or nitrites on the urinalysis result does not correlate with infection.  Urine microscopic not indicated.   COMPREHENSIVE METABOLIC PANEL - Abnormal; Notable for the following components:    Glucose 973 (*)     Sodium 127 (*)     Chloride 87 (*)     CO2 21.0 (*)     Calcium 10.8 (*)     ALT (SGPT) 53 (*)     Alkaline Phosphatase 123 (*)     Anion Gap 19.0 (*)     All  other components within normal limits    Narrative:     GFR Normal >60  Chronic Kidney Disease <60  Kidney Failure <15     BLOOD GAS, VENOUS - Abnormal; Notable for the following components:    pO2, Venous 44.4 (*)     O2 Saturation, Venous 78.2 (*)     All other components within normal limits   OSMOLALITY, SERUM - Abnormal; Notable for the following components:    Osmolality 329 (*)     All other components within normal limits   HEMOGLOBIN A1C - Abnormal; Notable for the following components:    Hemoglobin A1C 10.00 (*)     All other components within normal limits   POCT GLUCOSE FINGERSTICK - Abnormal; Notable for the following components:    Glucose >600 (*)     All other components within normal limits   POCT GLUCOSE FINGERSTICK - Abnormal; Notable for the following components:    Glucose >600 (*)     All other components within normal limits   POCT GLUCOSE FINGERSTICK - Abnormal; Notable for the following components:    Glucose 527 (*)     All other components within normal limits   SINGLE HS TROPONIN T - Normal    Narrative:     High Sensitive Troponin T Reference Range:  <14.0 ng/L- Negative Female for AMI  <22.0 ng/L- Negative Male for AMI  >=14 - Abnormal Female indicating possible myocardial injury.  >=22 - Abnormal Male indicating possible myocardial injury.   Clinicians would have to utilize clinical acumen, EKG, Troponin, and serial changes to determine if it is an Acute Myocardial Infarction or myocardial injury due to an underlying chronic condition.        CBC WITH AUTO DIFFERENTIAL - Normal   PHOSPHORUS   MAGNESIUM   BASIC METABOLIC PANEL   BASIC METABOLIC PANEL   MAGNESIUM   MAGNESIUM   PHOSPHORUS   PHOSPHORUS   BASIC METABOLIC PANEL   MAGNESIUM   PHOSPHORUS   CBC AND DIFFERENTIAL    Narrative:     The following orders were created for panel order CBC & Differential.  Procedure                               Abnormality         Status                     ---------                                -----------         ------                     CBC Auto Differential[[id number]]        Normal              Final result                 Please view results for these tests on the individual orders.       LOC: Person, Place, Time, and Situation    Telemetry:  Med/Surg    Cardiac Monitoring Ordered: yes    EKG:   ECG 12 Lead Tachycardia   Preliminary Result   HEART RATE= 94  bpm   RR Interval= 636  ms   DE Interval= 141  ms   P Horizontal Axis= 9  deg   P Front Axis= 17  deg   QRSD Interval= 84  ms   QT Interval= 334  ms   QTcB= 419  ms   QRS Axis= 45  deg   T Wave Axis= 69  deg   - NORMAL ECG -   Sinus rhythm   Electronically Signed By:    Date and Time of Study: 2024-03-24 22:20:52          Medications Given in the ED:   Medications   sodium chloride 0.9 % flush 10 mL (has no administration in time range)   sodium chloride 0.9 % flush 10 mL (has no administration in time range)   sodium chloride 0.9 % flush 10 mL (has no administration in time range)   sodium chloride 0.9 % infusion 40 mL (has no administration in time range)   dextrose (GLUTOSE) oral gel 15 g (has no administration in time range)   dextrose (D50W) (25 g/50 mL) IV injection 10-50 mL (has no administration in time range)   Glucagon (GLUCAGEN) injection 1 mg (has no administration in time range)   sodium chloride 0.9 % bolus (1,000 mL/hr Intravenous New Bag 3/25/24 0014)   sodium chloride 0.9 % infusion (has no administration in time range)   sodium chloride 0.9 % with KCl 20 mEq/L infusion (has no administration in time range)   sodium chloride 0.9 % with KCl 40 mEq/L infusion (has no administration in time range)   dextrose 5 % and sodium chloride 0.9 % infusion (has no administration in time range)   dextrose 5 % and sodium chloride 0.9 % with KCl 20 mEq/L infusion (has no administration in time range)   dextrose 5 % and sodium chloride 0.9 % with KCl 40 mEq/L infusion (has no administration in time range)   sodium chloride 0.45 % infusion (has no  administration in time range)   sodium chloride 0.45 % with KCl 20 mEq/L infusion (has no administration in time range)   sodium chloride 0.45 % 1,000 mL with potassium chloride 40 mEq infusion (has no administration in time range)   dextrose 5 % and sodium chloride 0.45 % infusion (has no administration in time range)   dextrose 5 % and sodium chloride 0.45 % with KCl 20 mEq/L infusion (has no administration in time range)   dextrose 5 % and sodium chloride 0.45 % with KCl 40 mEq/L infusion (has no administration in time range)   insulin regular 1 unit/mL in 0.9% sodium chloride (Glucommander) (9.3 Units/hr Intravenous New Bag 3/25/24 0022)   Potassium Replacement - Follow Nurse / BPA Driven Protocol (has no administration in time range)   Magnesium Standard Dose Replacement - Follow Nurse / BPA Driven Protocol (has no administration in time range)   Phosphorus Replacement - Follow Nurse / BPA Driven Protocol (has no administration in time range)   Calcium Replacement - Follow Nurse / BPA Driven Protocol (has no administration in time range)   dextrose (D50W) (25 g/50 mL) IV injection 25 g (has no administration in time range)   hydrOXYzine (ATARAX) tablet 25 mg (has no administration in time range)   sodium chloride 0.9 % bolus 2,000 mL (0 mL Intravenous Stopped 3/25/24 0010)   hydrOXYzine (ATARAX) tablet 25 mg (25 mg Oral Given 3/24/24 2212)       Imaging results:  XR Chest 1 View    Result Date: 3/25/2024  Impression: No acute cardiopulmonary abnormality. Electronically Signed: Connor Vidal MD  3/25/2024 12:29 AM EDT  Workstation ID: [id number]     Social issues:   Social History     Socioeconomic History    Marital status: Single   Tobacco Use    Smoking status: Never    Smokeless tobacco: Never   Vaping Use    Vaping status: Never Used   Substance and Sexual Activity    Alcohol use: Never    Drug use: Never    Sexual activity: Not Currently       NIH Stroke Scale:  Interval: (not recorded)  1a. Level of  Consciousness: (not recorded)  1b. LOC Questions: (not recorded)  1c. LOC Commands: (not recorded)  2. Best Gaze: (not recorded)  3. Visual: (not recorded)  4. Facial Palsy: (not recorded)  5a. Motor Arm, Left: (not recorded)  5b. Motor Arm, Right: (not recorded)  6a. Motor Leg, Left: (not recorded)  6b. Motor Leg, Right: (not recorded)  7. Limb Ataxia: (not recorded)  8. Sensory: (not recorded)  9. Best Language: (not recorded)  10. Dysarthria: (not recorded)  11. Extinction and Inattention (formerly Neglect): (not recorded)    Total (NIH Stroke Scale): (not recorded)     Additional notable assessment information:NA     Nursing report ED to floor:  WILFRID Walker RN   03/25/24 00:43 EDT

## 2024-03-25 NOTE — PLAN OF CARE
Problem: Adult Inpatient Plan of Care  Goal: Plan of Care Review  3/25/2024 1709 by Mariaelena Gilliam RN  Outcome: Ongoing, Progressing  Flowsheets (Taken 3/25/2024 1709)  Progress: improving  Plan of Care Reviewed With: patient  Outcome Evaluation: Pt off insulin gtt, transitioned to Lantus and SSI. DM educator saw patient.  3/25/2024 1709 by Mariaelena Gilliam RN  Outcome: Ongoing, Progressing  Flowsheets (Taken 3/25/2024 1709)  Progress: improving  Plan of Care Reviewed With: patient  Outcome Evaluation: Pt off insulin gtt, transitioned to Lantus and SSI. DM educator saw patient.  Goal: Patient-Specific Goal (Individualized)  3/25/2024 1709 by Mariaelena Gilliam RN  Outcome: Ongoing, Progressing  3/25/2024 1709 by Mariaelena Gilliam RN  Outcome: Ongoing, Progressing  Goal: Absence of Hospital-Acquired Illness or Injury  3/25/2024 1709 by Mariaelena Gilliam RN  Outcome: Ongoing, Progressing  3/25/2024 1709 by Mariaelena Gilliam RN  Outcome: Ongoing, Progressing  Intervention: Identify and Manage Fall Risk  Recent Flowsheet Documentation  Taken 3/25/2024 1600 by Mariaelena Gilliam RN  Safety Promotion/Fall Prevention:   safety round/check completed   room organization consistent   nonskid shoes/slippers when out of bed   lighting adjusted   fall prevention program maintained   clutter free environment maintained  Taken 3/25/2024 1400 by Mariaelena Gilliam RN  Safety Promotion/Fall Prevention:   safety round/check completed   room organization consistent   nonskid shoes/slippers when out of bed   lighting adjusted   fall prevention program maintained   clutter free environment maintained   assistive device/personal items within reach  Taken 3/25/2024 1200 by Mariaelena Gilliam, RN  Safety Promotion/Fall Prevention:   safety round/check completed   room organization consistent   nonskid shoes/slippers when out of bed   lighting adjusted   fall prevention program maintained   clutter free environment  maintained   assistive device/personal items within reach  Taken 3/25/2024 1000 by Mariaelena Gillaim RN  Safety Promotion/Fall Prevention:   safety round/check completed   room organization consistent   nonskid shoes/slippers when out of bed   lighting adjusted  Taken 3/25/2024 0800 by Mariaelena Gilliam RN  Safety Promotion/Fall Prevention:   safety round/check completed   room organization consistent   nonskid shoes/slippers when out of bed   lighting adjusted   fall prevention program maintained   clutter free environment maintained   assistive device/personal items within reach  Intervention: Prevent Skin Injury  Recent Flowsheet Documentation  Taken 3/25/2024 1600 by Mariaelena Gilliam RN  Body Position: position changed independently  Skin Protection:   adhesive use limited   transparent dressing maintained   tubing/devices free from skin contact  Taken 3/25/2024 1400 by Mariaelena Gilliam RN  Body Position: position changed independently  Taken 3/25/2024 1200 by Mariaelena Gilliam RN  Body Position: position changed independently  Skin Protection:   adhesive use limited   transparent dressing maintained   tubing/devices free from skin contact  Taken 3/25/2024 1000 by Mariaelena Gilliam RN  Body Position: position changed independently  Taken 3/25/2024 0800 by Mariaelena Gilliam RN  Body Position: position changed independently  Skin Protection:   adhesive use limited   transparent dressing maintained   tubing/devices free from skin contact  Intervention: Prevent and Manage VTE (Venous Thromboembolism) Risk  Recent Flowsheet Documentation  Taken 3/25/2024 1600 by Mariaelena Gilliam RN  VTE Prevention/Management:   sequential compression devices off   patient refused intervention  Range of Motion: active ROM (range of motion) encouraged  Taken 3/25/2024 1200 by Mariaelena Gilliam RN  Activity Management: up ad javed  VTE Prevention/Management:   sequential compression devices off   patient refused  intervention  Range of Motion: active ROM (range of motion) encouraged  Taken 3/25/2024 0800 by Mariaelena Gilliam RN  Activity Management: up ad javed  VTE Prevention/Management:   sequential compression devices off   patient refused intervention  Range of Motion: active ROM (range of motion) encouraged  Intervention: Prevent Infection  Recent Flowsheet Documentation  Taken 3/25/2024 1600 by Mariaelena Gilliam RN  Infection Prevention:   single patient room provided   rest/sleep promoted   hand hygiene promoted   environmental surveillance performed  Taken 3/25/2024 1400 by Mariaelena Gilliam RN  Infection Prevention:   single patient room provided   rest/sleep promoted   hand hygiene promoted   environmental surveillance performed  Taken 3/25/2024 1200 by Mariaelena Gilliam RN  Infection Prevention:   single patient room provided   rest/sleep promoted   hand hygiene promoted   environmental surveillance performed  Taken 3/25/2024 1000 by Mariaelena Gilliam RN  Infection Prevention:   single patient room provided   rest/sleep promoted   hand hygiene promoted   environmental surveillance performed  Taken 3/25/2024 0800 by Mariaelena Gilliam RN  Infection Prevention:   single patient room provided   rest/sleep promoted   hand hygiene promoted   environmental surveillance performed  Goal: Optimal Comfort and Wellbeing  3/25/2024 1709 by Mariaelena Gilliam RN  Outcome: Ongoing, Progressing  3/25/2024 1709 by Mariaelena Gilliam RN  Outcome: Ongoing, Progressing  Intervention: Provide Person-Centered Care  Recent Flowsheet Documentation  Taken 3/25/2024 1600 by Mariaelena Gilliam RN  Trust Relationship/Rapport:   care explained   questions answered  Taken 3/25/2024 1200 by Mariaelena Gilliam RN  Trust Relationship/Rapport:   care explained   questions answered  Taken 3/25/2024 0800 by Mariaelena Gilliam RN  Trust Relationship/Rapport: care explained  Goal: Readiness for Transition of Care  3/25/2024 1709 by  Mariaelena Gilliam, RN  Outcome: Ongoing, Progressing  3/25/2024 1709 by Mariaelena Gilliam, RN  Outcome: Ongoing, Progressing     Problem: Behavioral Health Comorbidity  Goal: Maintenance of Behavioral Health Symptom Control  3/25/2024 1709 by Mariaelena Gilliam, RN  Outcome: Ongoing, Progressing  3/25/2024 1709 by Mariaelena Gilliam, RN  Outcome: Ongoing, Progressing   Goal Outcome Evaluation:  Plan of Care Reviewed With: patient        Progress: improving  Outcome Evaluation: Pt off insulin gtt, transitioned to Lantus and SSI. DM educator saw patient.

## 2024-03-25 NOTE — CONSULTS
"  Referring Provider: Iris Villegas APRN   Reason for Consultation: uncontrolled anxiety     Chief complaint anxiety    Subjective .     History of present illness:  The patient is a 23 y.o. male who was admitted secondary to anxiety.  Patient reports he was experiencing panic attacks, feeling \"on edge \"and came to the hospital. Newly diagnosed diabetes.  A1c was 10  Admitting serum glucose 973    History of hypertension, psoriasis, elevated liver enzymes.  Patient reports recently experiencing anxiety symptoms in adolescence.  History of panic attacks.  History of psychiatric hospitalization for anxiety and depression.  Other psychiatric diagnoses include bipolar disorder, diagnosed due to symptoms of paranoia, impulsivity.  No follow-up with diagnosing provider.  Denies history of manic episode, recurrent symptoms of nichole or hypomania.  Denies history of or current symptoms of psychosis    Anxieties associated with decreased concentration, intrusive thoughts, anxious distress.  Patient reports symptoms of depression including low mood, decreased energy and motivation.  Symptoms in the last 3 years have become intrusive to work and personal life . Distressing to the pt occurring daily,  through out the day.  Limited support      Has tried Lexapro in the past ineffective and causing, insomnia and paranoia.  Patient was started on hydroxyzine during this admission, effective for anxiety rates current anxiety 2 out of 10 after taking.    Denies substance abuse including alcohol and illicit and prescription drugs       The following portions of the patient's history were reviewed and updated as appropriate: allergies, current medications, past family history, past medical history, past social history, past surgical history and problem list.    History    Objective     Vital Signs   /76   Pulse 71   Temp 98 °F (36.7 °C) (Oral)   Resp 14   Ht 188 cm (74\")   Wt (!) 138 kg (304 lb 7.3 oz)   SpO2 96%   BMI " 39.09 kg/m²       MENTAL STATUS EXAM   General Appearance:  Cleanly groomed and dressed  Eye Contact:  Good eye contact  Attitude:  Cooperative and polite  Muscle Strength:  Normal  Speech:  Normal rate, tone, volume  Language:  Spontaneous  Mood and affect:  Normal, pleasant  Hopelessness:  Denies  Thought Process:  Logical, goal-directed and linear  Associations/ Thought Content:  No delusions  Hallucinations:  None  Suicidal Ideations:  Not present  Homicidal Ideation:  Not present  Sensorium:  Alert and clear  Orientation:  Person, place, situation and time  Attention Span/ Concentration:  Good  Fund of Knowledge:  Appropriate for age and educational level  Intellectual Functioning:  Average range  Insight:  Good  Judgement:  Good  Reliability:  Good  Impulse Control:  Good         Assessment & Plan       Newly diagnosed diabetes       Assessment: Generalized anxiety disorder, acute anxiety and panic related to medical condition ( hyperglycemia) , mild depression, unspecified whether recurrent  1. Anxiety    2. Hyperosmolar hyperglycemic state (HHS)      Treatment Plan: Patient reports symptoms of anxiety and depression.  Patient's admitting serum glucose level was 973, patient reports intense anxiety at that time, improved now.     Delirium and psychotic symptoms can occur with hyperglycemia and uncontrolled diabetes.  Anxiety, agitation, impulsivity, paranoia can occur.   Hydroxyzine as needed helpful, continue medication.  Patient can be seen by outpatient psychiatric provider, however symptoms may resolve with controlled glucose levels.  Mild anxiety does appear to be chronic issue  Patient reports anxiety since adolescence, can initiate SSRI therapy  Patient agreeable to start sertraline 25 mg nightly and follow-up outpatient  Will follow    Treatment Plan discussed with: Patient    I discussed the patients findings and my recommendations with patient and nursing staff    I have reviewed and approved the  behavioral health treatment plans and problem list. Yes  Thank you for the consult   Referring MD has access to consult report and progress notes in EMR     Carmela Cali DNP, APRN  03/25/24  10:04 EDT

## 2024-03-25 NOTE — CONSULTS
"Diabetes Education  Assessment/Teaching    Patient Name:  Seth Wild  YOB: 2000  MRN: [id number]  Admit Date:  3/24/2024      Assessment Date:  3/25/2024  Flowsheet Row Most Recent Value   General Information     Referral From: MD order  [Consult received due to new diagnosis of diabetes. Pt admitted with HHS and needing insulin teaching. A1c this adm 10.0%. Adm bs 973.]   Height 188 cm (74\")   Height Method Stated   Weight 138 kg (304 lb 7.3 oz)   Weight Method Bed scale   Pregnancy Assessment    Diabetes History    What type of diabetes do you have? Type 2   Length of Diabetes Diagnosis --  [New diagnosis.]   Education Preferences    Nutrition Information    Assessment Topics    DM Goals             Flowsheet Row Most Recent Value   DM Education Needs    Meter Needs meter  [Instructed pt in use of Accuchek Guide Me meter. Pt inserted test strip into meter and loaded lancet into lancing device. He demostrated correct use of lancing device. Pt understands where to apply blood sample.]   Meter Type Accuchek   Frequency of Testing AC/HS  [Discussed checking bs ac and hs and recording readings. Discussed importance of taking readings to MD visits. Gave log book.]   Blood Glucose Target Range Discussed A1c result of 10.0%. Reviewed healthy bs range and healthy A1c target. Discussed importance of bs control.   Medication Insulin  [Pt has been started on basal/bolus insulin therapy.]   Problem Solving Hypoglycemia, Hyperglycemia, Signs, Symptoms, Treatment  [Discussed importance of always carrying low bs tx. Gave low bs handout.]   Reducing Risks A1C testing  [Gave A1c info sheet.]   Healthy Eating --  [Pt usually eating 2 meals/day (lunch and supper). Pt states has been drinking about 3 12-oz cans of regular soda daily. Encouraged pt to drink non-sugared beverages.]   Physical Activity --  [Pt states used to go to the [employer]. He had started going daily then decreased to 1-2 times/week and then in " March, he stopped going. Discussed importance of routine exercise in helping with bs control.]   Motivation Strong   Teaching Method Explanation, Discussion, Demonstration, Handouts   Patient Response Verbalized understanding, Demonstrates adequately              Other Comments:  Met with pt at bedside. Pt being followed by endocrinology. He states plans to follow up with endocrinology as outpatient. Reviewed basics of disease process. Discussed meal plan and exercise are patient's primary tx and medication is a secondary tx.     Instructed pt in use of insulin pen. Pt performed return demo correctly and injected into foam pad correctly. Discussed when to take long-acting and mealtime insulin. Discussed importance of not taking mealtime insulin if not eating. Discussed injection sites, rotation of sites, storage of insulin and disposal of pen needles. Pt states he used to take injections (biologics) and he feels comfortable with giving injections. Pt states he does not need to practice injection procedure.     Pt is interested in getting a continuous glucose sensor. Discussed with pt that educator can help pt download Freestyle Sonny 3 tammy and then pt can try a sensor for 14 days. Pt states battery phone also dead. Educator to follow up in am to download tammy and begin sensor. Pt would prefer educator discuss meal plan on different day. Educator will attempt follow up tomorrow. Rxs have been started for lantus pen, lispro pen, pen needles, Accuchek Guide Me meter, test strips and lancets. Written material given: First Steps booklet, Planning Healhy Meals packet, and Insulin Pen Instruction sheet.       Electronically signed by:  Sadie Doan RN  03/25/24 19:20 EDT

## 2024-03-26 ENCOUNTER — READMISSION MANAGEMENT (OUTPATIENT)
Dept: CALL CENTER | Facility: HOSPITAL | Age: 24
End: 2024-03-26
Payer: COMMERCIAL

## 2024-03-26 VITALS
BODY MASS INDEX: 39.07 KG/M2 | TEMPERATURE: 98 F | WEIGHT: 304.45 LBS | HEIGHT: 74 IN | HEART RATE: 97 BPM | DIASTOLIC BLOOD PRESSURE: 80 MMHG | RESPIRATION RATE: 18 BRPM | OXYGEN SATURATION: 97 % | SYSTOLIC BLOOD PRESSURE: 153 MMHG

## 2024-03-26 LAB
ANION GAP SERPL CALCULATED.3IONS-SCNC: 12 MMOL/L (ref 5–15)
BASOPHILS # BLD AUTO: 0.08 10*3/MM3 (ref 0–0.2)
BASOPHILS NFR BLD AUTO: 0.9 % (ref 0–1.5)
BUN SERPL-MCNC: 7 MG/DL (ref 6–20)
BUN/CREAT SERPL: 12.1 (ref 7–25)
CALCIUM SPEC-SCNC: 9.4 MG/DL (ref 8.6–10.5)
CHLORIDE SERPL-SCNC: 99 MMOL/L (ref 98–107)
CO2 SERPL-SCNC: 23 MMOL/L (ref 22–29)
CREAT SERPL-MCNC: 0.58 MG/DL (ref 0.76–1.27)
DEPRECATED RDW RBC AUTO: 38 FL (ref 37–54)
EGFRCR SERPLBLD CKD-EPI 2021: 138.8 ML/MIN/1.73
EOSINOPHIL # BLD AUTO: 0.32 10*3/MM3 (ref 0–0.4)
EOSINOPHIL NFR BLD AUTO: 3.7 % (ref 0.3–6.2)
ERYTHROCYTE [DISTWIDTH] IN BLOOD BY AUTOMATED COUNT: 12.5 % (ref 12.3–15.4)
GLUCOSE BLDC GLUCOMTR-MCNC: 296 MG/DL (ref 70–105)
GLUCOSE BLDC GLUCOMTR-MCNC: 299 MG/DL (ref 70–105)
GLUCOSE SERPL-MCNC: 290 MG/DL (ref 65–99)
HCT VFR BLD AUTO: 45.8 % (ref 37.5–51)
HGB BLD-MCNC: 15.6 G/DL (ref 13–17.7)
IMM GRANULOCYTES # BLD AUTO: 0.03 10*3/MM3 (ref 0–0.05)
IMM GRANULOCYTES NFR BLD AUTO: 0.3 % (ref 0–0.5)
LYMPHOCYTES # BLD AUTO: 3.27 10*3/MM3 (ref 0.7–3.1)
LYMPHOCYTES NFR BLD AUTO: 37.9 % (ref 19.6–45.3)
MAGNESIUM SERPL-MCNC: 1.7 MG/DL (ref 1.6–2.6)
MCH RBC QN AUTO: 29.1 PG (ref 26.6–33)
MCHC RBC AUTO-ENTMCNC: 34.1 G/DL (ref 31.5–35.7)
MCV RBC AUTO: 85.3 FL (ref 79–97)
MONOCYTES # BLD AUTO: 0.49 10*3/MM3 (ref 0.1–0.9)
MONOCYTES NFR BLD AUTO: 5.7 % (ref 5–12)
NEUTROPHILS NFR BLD AUTO: 4.43 10*3/MM3 (ref 1.7–7)
NEUTROPHILS NFR BLD AUTO: 51.5 % (ref 42.7–76)
NRBC BLD AUTO-RTO: 0 /100 WBC (ref 0–0.2)
PHOSPHATE SERPL-MCNC: 3 MG/DL (ref 2.5–4.5)
PLATELET # BLD AUTO: 287 10*3/MM3 (ref 140–450)
PMV BLD AUTO: 10.1 FL (ref 6–12)
POTASSIUM SERPL-SCNC: 4 MMOL/L (ref 3.5–5.2)
RBC # BLD AUTO: 5.37 10*6/MM3 (ref 4.14–5.8)
SODIUM SERPL-SCNC: 134 MMOL/L (ref 136–145)
WBC NRBC COR # BLD AUTO: 8.62 10*3/MM3 (ref 3.4–10.8)

## 2024-03-26 PROCEDURE — 82948 REAGENT STRIP/BLOOD GLUCOSE: CPT

## 2024-03-26 PROCEDURE — 83735 ASSAY OF MAGNESIUM: CPT | Performed by: STUDENT IN AN ORGANIZED HEALTH CARE EDUCATION/TRAINING PROGRAM

## 2024-03-26 PROCEDURE — 85025 COMPLETE CBC W/AUTO DIFF WBC: CPT | Performed by: STUDENT IN AN ORGANIZED HEALTH CARE EDUCATION/TRAINING PROGRAM

## 2024-03-26 PROCEDURE — 63710000001 INSULIN LISPRO (HUMAN) PER 5 UNITS: Performed by: INTERNAL MEDICINE

## 2024-03-26 PROCEDURE — 80048 BASIC METABOLIC PNL TOTAL CA: CPT | Performed by: STUDENT IN AN ORGANIZED HEALTH CARE EDUCATION/TRAINING PROGRAM

## 2024-03-26 PROCEDURE — 99232 SBSQ HOSP IP/OBS MODERATE 35: CPT | Performed by: INTERNAL MEDICINE

## 2024-03-26 PROCEDURE — G0108 DIAB MANAGE TRN  PER INDIV: HCPCS

## 2024-03-26 PROCEDURE — 84100 ASSAY OF PHOSPHORUS: CPT | Performed by: STUDENT IN AN ORGANIZED HEALTH CARE EDUCATION/TRAINING PROGRAM

## 2024-03-26 PROCEDURE — 63710000001 INSULIN GLARGINE PER 5 UNITS: Performed by: INTERNAL MEDICINE

## 2024-03-26 RX ORDER — INSULIN LISPRO 100 [IU]/ML
12 INJECTION, SOLUTION INTRAVENOUS; SUBCUTANEOUS
Status: DISCONTINUED | OUTPATIENT
Start: 2024-03-26 | End: 2024-03-26 | Stop reason: HOSPADM

## 2024-03-26 RX ORDER — INSULIN ASPART 100 [IU]/ML
12 INJECTION, SOLUTION INTRAVENOUS; SUBCUTANEOUS
Qty: 15 ML | Refills: 0 | Status: SHIPPED | OUTPATIENT
Start: 2024-03-26 | End: 2024-05-07

## 2024-03-26 RX ORDER — LISINOPRIL 5 MG/1
10 TABLET ORAL
Status: DISCONTINUED | OUTPATIENT
Start: 2024-03-26 | End: 2024-03-26 | Stop reason: HOSPADM

## 2024-03-26 RX ORDER — LANCETS
1 EACH MISCELLANEOUS
Qty: 200 EACH | Refills: 2 | Status: SHIPPED | OUTPATIENT
Start: 2024-03-26

## 2024-03-26 RX ORDER — BLOOD-GLUCOSE METER
1 EACH MISCELLANEOUS ONCE
Qty: 1 KIT | Refills: 0 | Status: SHIPPED | OUTPATIENT
Start: 2024-03-26 | End: 2024-03-27

## 2024-03-26 RX ORDER — SERTRALINE HYDROCHLORIDE 25 MG/1
25 TABLET, FILM COATED ORAL NIGHTLY
Qty: 30 TABLET | Refills: 0 | Status: SHIPPED | OUTPATIENT
Start: 2024-03-26 | End: 2024-04-25

## 2024-03-26 RX ORDER — HYDROXYZINE HYDROCHLORIDE 25 MG/1
25 TABLET, FILM COATED ORAL 3 TIMES DAILY PRN
Qty: 90 TABLET | Refills: 0 | Status: SHIPPED | OUTPATIENT
Start: 2024-03-26 | End: 2024-04-25

## 2024-03-26 RX ORDER — PEN NEEDLE, DIABETIC 30 GX3/16"
1 NEEDLE, DISPOSABLE MISCELLANEOUS 4 TIMES DAILY
Qty: 200 EACH | Refills: 2 | Status: SHIPPED | OUTPATIENT
Start: 2024-03-26

## 2024-03-26 RX ORDER — INSULIN LISPRO 100 [IU]/ML
10 INJECTION, SOLUTION INTRAVENOUS; SUBCUTANEOUS
Qty: 9 ML | Refills: 0 | Status: SHIPPED | OUTPATIENT
Start: 2024-03-26 | End: 2024-03-26

## 2024-03-26 RX ORDER — BLOOD-GLUCOSE SENSOR
1 EACH MISCELLANEOUS
Qty: 2 EACH | Refills: 2 | Status: SHIPPED | OUTPATIENT
Start: 2024-03-26

## 2024-03-26 RX ORDER — BLOOD SUGAR DIAGNOSTIC
1 STRIP MISCELLANEOUS
Qty: 150 EACH | Refills: 2 | Status: SHIPPED | OUTPATIENT
Start: 2024-03-26

## 2024-03-26 RX ADMIN — INSULIN LISPRO 6 UNITS: 100 INJECTION, SOLUTION INTRAVENOUS; SUBCUTANEOUS at 12:44

## 2024-03-26 RX ADMIN — INSULIN LISPRO 10 UNITS: 100 INJECTION, SOLUTION INTRAVENOUS; SUBCUTANEOUS at 12:44

## 2024-03-26 RX ADMIN — Medication 10 ML: at 08:38

## 2024-03-26 RX ADMIN — INSULIN LISPRO 6 UNITS: 100 INJECTION, SOLUTION INTRAVENOUS; SUBCUTANEOUS at 08:38

## 2024-03-26 RX ADMIN — INSULIN LISPRO 10 UNITS: 100 INJECTION, SOLUTION INTRAVENOUS; SUBCUTANEOUS at 08:38

## 2024-03-26 RX ADMIN — INSULIN GLARGINE 30 UNITS: 100 INJECTION, SOLUTION SUBCUTANEOUS at 08:38

## 2024-03-26 RX ADMIN — Medication 10 ML: at 08:39

## 2024-03-26 RX ADMIN — LISINOPRIL 10 MG: 5 TABLET ORAL at 05:27

## 2024-03-26 NOTE — CONSULTS
Diabetes Education    Patient Name:  Seth Wild  YOB: 2000  MRN: [id number]  Admit Date:  3/24/2024    Follow up note: Met with pt at bedside. Pt downloaded the Freestyle Sonny 3 tammy. Reviewed alerts/symbols. Discussed sensor reading versus bs reading. Discussed where to apply sensor. Discussed sensor changed every 14 days. Pt applied sensor to upper back left arm with appropriate technique. Sensor activated and in 60 minute warm up period. Discussed importance of checking bs if pt feels he is having low bs or feels different than what sensor reading is giving him.     Discussed food groups containing CHO. Reviewed example serving sizes from these groups. Discussed trying to stay within 4 servings at each meal. Discussed low-fat meal prep. Discussed trying to stay within 9 oz of protein per day and 5 servings of fat per day. Pt has Planning Healthy Meals packet for review. Encouraged pt to attend [facility] classes for continued education.    Pt states he feels comfortable with all info covered. Pt states not needing additional info at this time.      Electronically signed by:  Sadie Doan RN  03/26/24 12:16 EDT

## 2024-03-26 NOTE — PLAN OF CARE
Goal Outcome Evaluation:      Pt A&Ox4. Adequate urine output, no BM this shift. Pt hypertensive at 0100. Iris HALE notified, continuous NS stopped. Pt had been drinking adequate amount of fluids PO. Pt hypertensive again at 0500. At home lisinopril restarted. Otherwise vital signs stable, continuing to monitor.

## 2024-03-26 NOTE — PROGRESS NOTES
Daily Progress Note    Patient Care Team:  Nigel Benavidez MD as PCP - General (Internal Medicine)    Chief Complaint: Follow-up type 2 diabetes    HPI: Patient seen, clinically doing well.  Blood sugars have improved.  Off IV insulin and IV fluids.  Seen by diabetes educators.    ROS:   Constitutional:  Denies fatigue, tiredness.    Respiratory: denies cough, shortness of breath.   Cardiovascular:  denies chest pain, edema   GI:  Denies abdominal pain, nausea, vomiting.         Vitals:    03/26/24 0710   BP: 115/64   Pulse: 63   Resp: 16   Temp: 98 °F (36.7 °C)   SpO2: 95%     Body mass index is 39.09 kg/m².    Physical Exam:  GEN: NAD, conversant  PSYCH: Awake and coherent      Results Review:     I reviewed the patient's new clinical results.    Glucose   Date Value Ref Range Status   03/26/2024 290 (H) 65 - 99 mg/dL Final     Sodium   Date Value Ref Range Status   03/26/2024 134 (L) 136 - 145 mmol/L Final     Potassium   Date Value Ref Range Status   03/26/2024 4.0 3.5 - 5.2 mmol/L Final     CO2   Date Value Ref Range Status   03/26/2024 23.0 22.0 - 29.0 mmol/L Final     Chloride   Date Value Ref Range Status   03/26/2024 99 98 - 107 mmol/L Final     Anion Gap   Date Value Ref Range Status   03/26/2024 12.0 5.0 - 15.0 mmol/L Final     Creatinine   Date Value Ref Range Status   03/26/2024 0.58 (L) 0.76 - 1.27 mg/dL Final     BUN   Date Value Ref Range Status   03/26/2024 7 6 - 20 mg/dL Final     BUN/Creatinine Ratio   Date Value Ref Range Status   03/26/2024 12.1 7.0 - 25.0 Final     Calcium   Date Value Ref Range Status   03/26/2024 9.4 8.6 - 10.5 mg/dL Final     Alkaline Phosphatase   Date Value Ref Range Status   03/24/2024 123 (H) 39 - 117 U/L Final     Total Protein   Date Value Ref Range Status   03/24/2024 8.1 6.0 - 8.5 g/dL Final     ALT (SGPT)   Date Value Ref Range Status   03/24/2024 53 (H) 1 - 41 U/L Final     AST (SGOT)   Date Value Ref Range Status   03/24/2024 17 1 - 40 U/L Final  "    Total Bilirubin   Date Value Ref Range Status   03/24/2024 0.6 0.0 - 1.2 mg/dL Final     Albumin   Date Value Ref Range Status   03/24/2024 4.8 3.5 - 5.2 g/dL Final     Globulin   Date Value Ref Range Status   03/24/2024 3.3 gm/dL Final     Magnesium   Date Value Ref Range Status   03/26/2024 1.7 1.6 - 2.6 mg/dL Final     Phosphorus   Date Value Ref Range Status   03/26/2024 3.0 2.5 - 4.5 mg/dL Final     Lab Results   Component Value Date    HGBA1C 10.00 (H) 03/24/2024    HGBA1C 5.2 06/21/2021     No results found for: \"GLUF\", \"MICROALBUR\"  Results from last 7 days   Lab Units 03/26/24  1209 03/26/24  0825 03/25/24  2045 03/25/24  1739 03/25/24  1256 03/25/24  1152   GLUCOSE mg/dL 296* 299* 265* 335* 149* 150*     Medication Review: Reviewed.     insulin glargine, 30 Units, Subcutaneous, Daily  insulin lispro, 10 Units, Subcutaneous, TID With Meals  insulin lispro, 2-9 Units, Subcutaneous, TID With Meals  lisinopril, 10 mg, Oral, Q24H  sertraline, 25 mg, Oral, Nightly  sodium chloride, 10 mL, Intravenous, Q12H  sodium chloride, 10 mL, Intravenous, Q12H      Assessment and plan:  Diabetes mellitus type 2 with hyperglycemia: Uncontrolled, will increase glargine to 35 units subcu daily and lispro to 12 units with each meal and follow blood sugars.  Okay to discharge from endocrine service perspective and follow-up in the office in next 2 to 4 weeks.    Hypertension: On lisinopril.    Shelly Orr MD. FACE                "

## 2024-03-26 NOTE — DISCHARGE SUMMARY
[facility] Medicine Services  Discharge Summary    Date of Service: 24    Patient Name: Seth Wild  : 2000  MRN: [id number]    Date of Admission: 3/24/2024  Discharge Diagnosis: NKHSS  Date of Discharge:  24    Primary Care Physician: Nigel Benavidez MD      Presenting Problem:   Anxiety [F41.9]  Newly diagnosed diabetes [E11.9]  Hyperosmolar hyperglycemic state (HHS) [E11.00]    Active and Resolved Hospital Problems:  #Nonketotic hyperosmolar state  #Newly diagnosed diabetes   #Anxiety disorder   #Essential HTN  #Hx psoriasis  #Elevated liver enzymes   #Obesity    Hospital Course     HPI:  Per the H&P  see HPI      Hospital Course:  Patient was started on insulin drip and hydrated with IV fluid.  Was initially n.p.o. transition to subcu long-acting and short acting insulin and blood sugar improved.  Endocrine as well as diabetic educator were consulted.  Patient general condition improved and discharged with Premeal and long-acting nightly insulin and plan to follow-up with PCP and endocrinologist as outpatient        DISCHARGE Follow Up Recommendations for labs and diagnostics:   Pcp and  endocrinologist      Reasons For Change In Medications and Indications for New Medications:      Day of Discharge     Vital Signs:  Temp:  [97.9 °F (36.6 °C)-98.1 °F (36.7 °C)] 98 °F (36.7 °C)  Heart Rate:  [58-93] 63  Resp:  [15-17] 16  BP: (115-171)/(43-94) 115/64    Physical Exam:  Physical Exam   HENT:      Mouth/Throat:      Mouth: Mucous membranes are moist.   Eyes:      Extraocular Movements: Extraocular movements intact.      Conjunctiva/sclera: Conjunctivae normal.   Cardiovascular:      Rate and Rhythm: Normal rate.   Pulmonary:      Effort: Pulmonary effort is normal.   Abdominal:      General: Bowel sounds are normal.      Palpations: Abdomen is soft.   Musculoskeletal:         General: Normal range of motion.      Cervical back: Neck supple.   Skin:     General:  Skin is warm.      Comments: Elevated plaque like lesion on the forehead    Neurological:      General: No focal deficit present.      Mental Status: He is alert and oriented to person, place, and time.   Psychiatric:         Mood and Affect: Mood normal.     Pertinent  and/or Most Recent Results     LAB RESULTS:      Lab 03/26/24  1146 03/24/24 2204   WBC 8.62 7.76   HEMOGLOBIN 15.6 16.2   HEMATOCRIT 45.8 47.6   PLATELETS 287 325   NEUTROS ABS 4.43 4.36   IMMATURE GRANS (ABS) 0.03 0.02   LYMPHS ABS 3.27* 2.63   MONOS ABS 0.49 0.59   EOS ABS 0.32 0.11   MCV 85.3 85.9         Lab 03/26/24  1146 03/25/24  1812 03/25/24  0810 03/25/24  0359 03/25/24  0022 03/24/24  2341 03/24/24 2204   SODIUM 134*  --  141 141 131*  --  127*   POTASSIUM 4.0 4.1 3.6 4.1 3.9  --  4.6   CHLORIDE 99  --  105 103 93*  --  87*   CO2 23.0  --  26.0 25.0 21.0*  --  21.0*   ANION GAP 12.0  --  10.0 13.0 17.0*  --  19.0*   BUN 7  --  9 10 12  --  13   CREATININE 0.58*  --  0.68* 0.78 0.82  --  1.08   EGFR 138.8  --  132.3 126.9 125.0  --  97.7   GLUCOSE 290*  --  214* 291* 570*  --  973*   CALCIUM 9.4  --  9.1 9.8 10.1  --  10.8*   MAGNESIUM 1.7  --  2.3 2.3 2.1 2.1  --    PHOSPHORUS 3.0  --  3.8 3.2 4.2 4.7*  --    HEMOGLOBIN A1C  --   --   --   --   --   --  10.00*         Lab 03/24/24 2204   TOTAL PROTEIN 8.1   ALBUMIN 4.8   GLOBULIN 3.3   ALT (SGPT) 53*   AST (SGOT) 17   BILIRUBIN 0.6   ALK PHOS 123*         Lab 03/24/24 2204   HSTROP T <6         Lab 03/25/24  0022   CHOLESTEROL 284*   LDL CHOL 106*   TRIGLYCERIDES 1,394*             Lab 03/24/24  2205   FIO2 21     Brief Urine Lab Results  (Last result in the past 365 days)        Color   Clarity   Blood   Leuk Est   Nitrite   Protein   CREAT   Urine HCG        03/24/24 2204 Yellow   Clear   Negative   Negative   Negative   Negative                 Microbiology Results (last 10 days)       ** No results found for the last 240 hours. **            XR Chest 1 View    Result Date:  3/25/2024  Impression: Impression: No acute cardiopulmonary abnormality. Electronically Signed: Connor Vidal MD  3/25/2024 12:29 AM EDT  Workstation ID: [id number]                 Labs Pending at Discharge:      Procedures Performed           Consults:   Consults       Date and Time Order Name Status Description    3/25/2024 12:06 AM Inpatient Psychiatrist Consult Completed     3/25/2024 12:05 AM Inpatient Endocrinology Consult                Discharge Details        Discharge Medications        New Medications        Instructions Start Date   Accu-Chek Guide Me w/Device kit   1 each, Does not apply, Once, Dx: E11.65      Accu-Chek Guide test strip  Generic drug: glucose blood   1 each, Other, 4 Times Daily Before Meals & Nightly, Dx: E11.65. Use as instructed      Accu-Chek Softclix Lancets lancets   1 each, Other, 4 Times Daily Before Meals & Nightly, Dx: E11.65. Use as instructed      FreeStyle Sonny 3 Sensor misc   1 each, Does not apply, Every 14 Days, Dx: E11.65      Insulin Glargine 100 UNIT/ML injection pen  Commonly known as: LANTUS SOLOSTAR   35 Units, Subcutaneous, Daily      Insulin Lispro (1 Unit Dial) 100 UNIT/ML solution pen-injector  Commonly known as: HumaLOG KwikPen   12 Units, Subcutaneous, 3 Times Daily Before Meals      Pen Needles 32G X 4 MM misc   1 each, Does not apply, 4 Times Daily      sertraline 25 MG tablet  Commonly known as: ZOLOFT   25 mg, Oral, Nightly             Continue These Medications        Instructions Start Date   Cosentyx UnoReady 300 MG/2ML solution auto-injector  Generic drug: Secukinumab   Every 30 (Thirty) Days.      hydrOXYzine 25 MG tablet  Commonly known as: ATARAX   25 mg, Oral, 3 Times Daily PRN      lisinopril 10 MG tablet  Commonly known as: PRINIVIL,ZESTRIL   10 mg, Oral, Daily PRN               No Known Allergies      Discharge Disposition:   Home or Self Care    Diet:  Hospital:  Diet Order   Procedures    Diet: Diabetic; Consistent Carbohydrate; Fluid  Consistency: Thin (IDDSI 0)         Discharge Activity:   as tolerated       CODE STATUS:  Code Status and Medical Interventions:   Ordered at: 03/25/24 0049     Code Status (Patient has no pulse and is not breathing):    CPR (Attempt to Resuscitate)     Medical Interventions (Patient has pulse or is breathing):    Full Support         No future appointments.        Time spent on Discharge including face to face service:  >30 minutes    Signature: Electronically signed by Sim Forte MD, 03/26/24, 12:58 EDT.  [facility] [place] [facility]

## 2024-03-26 NOTE — OUTREACH NOTE
Prep Survey      Flowsheet Row Responses   [facility] patient discharged from? [facility]   Is LACE score < 7 ? Yes   Eligibility [facility]   Date of Admission 03/24/24   Date of Discharge 03/26/24   Discharge Disposition Home or Self Care   Discharge diagnosis Nonketotic hyperosmolar state   Does the patient have one of the following disease processes/diagnoses(primary or secondary)? Other   Does the patient have Home health ordered? No   Is there a DME ordered? No   Prep survey completed? Yes            JOHN ABARCA - Registered Nurse

## 2024-03-26 NOTE — PLAN OF CARE
Patient to D/C home. Waiting on meds to be finished at [facility] pharmacy. Patient to drive self home.

## 2024-03-27 ENCOUNTER — TELEPHONE (OUTPATIENT)
Facility: HOSPITAL | Age: 24
End: 2024-03-27
Payer: COMMERCIAL

## 2024-03-27 ENCOUNTER — TELEPHONE (OUTPATIENT)
Dept: DIABETES SERVICES | Facility: HOSPITAL | Age: 24
End: 2024-03-27
Payer: COMMERCIAL

## 2024-03-27 ENCOUNTER — TRANSITIONAL CARE MANAGEMENT TELEPHONE ENCOUNTER (OUTPATIENT)
Dept: CALL CENTER | Facility: HOSPITAL | Age: 24
End: 2024-03-27
Payer: COMMERCIAL

## 2024-03-27 NOTE — OUTREACH NOTE
Call Center TCM Note      Flowsheet Row Responses   [facility] facility patient discharged from? [facility]   Does the patient have one of the following disease processes/diagnoses(primary or secondary)? Other   TCM attempt successful? No   Unsuccessful attempts Attempt 1            Elena Lind MA    3/27/2024, 14:21 EDT

## 2024-03-27 NOTE — TELEPHONE ENCOUNTER
Patient returned call for follow-up. Patient did meds to beds and was able to get prescriptions prior to discharge. Patient stated his blood sugar has been around 250 but he is doing okay with administering insulin shots. Patient has no further questions or concerns related to diabetes at this time.

## 2024-03-27 NOTE — OUTREACH NOTE
Call Center TCM Note      Flowsheet Row Responses   [facility] facility patient discharged from? [facility]   Does the patient have one of the following disease processes/diagnoses(primary or secondary)? Other   TCM attempt successful? No   Unsuccessful attempts Attempt 2            Elena Lind MA    3/27/2024, 16:19 EDT

## 2024-03-28 ENCOUNTER — TRANSITIONAL CARE MANAGEMENT TELEPHONE ENCOUNTER (OUTPATIENT)
Dept: CALL CENTER | Facility: HOSPITAL | Age: 24
End: 2024-03-28
Payer: COMMERCIAL

## 2024-03-28 NOTE — CASE MANAGEMENT/SOCIAL WORK
Case Management Discharge Note        Transportation Services  Private: Car (with family)    Final Discharge Disposition Code: 01 - home or self-care

## 2024-03-28 NOTE — OUTREACH NOTE
Call Center TCM Note      Flowsheet Row Responses   [facility] facility patient discharged from? [facility]   Does the patient have one of the following disease processes/diagnoses(primary or secondary)? Other   TCM attempt successful? No   Unsuccessful attempts Attempt 3            Roya Pinzon LPN    3/28/2024, 11:06 EDT

## 2024-05-30 ENCOUNTER — TELEPHONE (OUTPATIENT)
Dept: FAMILY MEDICINE CLINIC | Facility: CLINIC | Age: 24
End: 2024-05-30

## 2024-05-30 NOTE — TELEPHONE ENCOUNTER
Caller: Seth Wild    Relationship: Self    Best call back number: [phone]     PATIENT HAS REQUESTED HIS YEARLY PHYSICAL TO BE WITH THE NURSE PRACTITIONER DUE TO DR. BOONE SCHEDULE.

## 2024-06-18 ENCOUNTER — OFFICE VISIT (OUTPATIENT)
Dept: FAMILY MEDICINE CLINIC | Facility: CLINIC | Age: 24
End: 2024-06-18
Payer: COMMERCIAL

## 2024-06-18 VITALS
BODY MASS INDEX: 40.43 KG/M2 | OXYGEN SATURATION: 98 % | DIASTOLIC BLOOD PRESSURE: 86 MMHG | WEIGHT: 315 LBS | RESPIRATION RATE: 18 BRPM | HEART RATE: 89 BPM | SYSTOLIC BLOOD PRESSURE: 148 MMHG | HEIGHT: 74 IN

## 2024-06-18 DIAGNOSIS — Z00.00 PREVENTATIVE HEALTH CARE: Primary | ICD-10-CM

## 2024-06-18 DIAGNOSIS — E11.9 NEWLY DIAGNOSED DIABETES: ICD-10-CM

## 2024-06-18 DIAGNOSIS — I10 ESSENTIAL HYPERTENSION: ICD-10-CM

## 2024-06-18 PROCEDURE — 99395 PREV VISIT EST AGE 18-39: CPT | Performed by: NURSE PRACTITIONER

## 2024-06-18 PROCEDURE — 99214 OFFICE O/P EST MOD 30 MIN: CPT | Performed by: NURSE PRACTITIONER

## 2024-06-18 RX ORDER — LISINOPRIL 10 MG/1
10 TABLET ORAL DAILY
Qty: 90 TABLET | Refills: 1 | Status: SHIPPED | OUTPATIENT
Start: 2024-06-18

## 2024-06-18 NOTE — PROGRESS NOTES
"Chief Complaint  Annual Exam  Subjective        eSth Wild presents to [facility]  History of Present Illness  Pt comes in today for routine physical and follow up from recent hospital stay in march. Went in on 3/24 with c/o severe anxiety attack, but at time of admission BS was 973. Was diagnosed with new onset Dm and started on insulin.   Was admitted for about 3 days.   Hasn't seen endo yet since discharge.   Currently taking novolog 12 units with meals and lantus 35 units.   A1C wa 10.0 at time of admission.  Currently BS running 110-120 fasting.   Pt states he used to take lisinopril for BP, but has been off it for about 6 months and would like to restart it.   Denies any CP, SOA, palpitations, dizziness, or HA's.        Objective     Vital Signs:   /86   Pulse 89   Resp 18   Ht 188 cm (74.02\")   Wt (!) 144 kg (317 lb 6.4 oz)   SpO2 98%   BMI 40.73 kg/m²       BP Readings from Last 3 Encounters:   06/18/24 148/86   03/26/24 153/80   10/05/22 150/82       Wt Readings from Last 3 Encounters:   06/18/24 (!) 144 kg (317 lb 6.4 oz)   03/25/24 (!) 138 kg (304 lb 7.3 oz)   10/05/22 (!) 147 kg (323 lb)     Physical Exam  Constitutional:       Appearance: He is well-developed.   HENT:      Head: Normocephalic.   Eyes:      Conjunctiva/sclera: Conjunctivae normal.      Pupils: Pupils are equal, round, and reactive to light.   Neck:      Thyroid: No thyromegaly.   Cardiovascular:      Rate and Rhythm: Normal rate and regular rhythm.      Heart sounds: No murmur heard.  Pulmonary:      Effort: Pulmonary effort is normal.      Breath sounds: Normal breath sounds.   Abdominal:      General: Bowel sounds are normal.      Palpations: Abdomen is soft.      Tenderness: There is no abdominal tenderness.   Musculoskeletal:         General: Normal range of motion.      Cervical back: Normal range of motion and neck supple.   Skin:     General: Skin is warm and dry.      Findings: " No lesion.   Neurological:      Mental Status: He is alert and oriented to person, place, and time.   Psychiatric:         Behavior: Behavior normal.        Result Review :                 Assessment and Plan    Diagnoses and all orders for this visit:    1. Preventative health care (Primary)  -     CBC & Differential; Future  -     Comprehensive Metabolic Panel; Future  -     Hemoglobin A1c; Future  -     Lipid Panel; Future  -     MicroAlbumin, Urine, Random - Urine, Clean Catch; Future  -     TSH; Future    2. Newly diagnosed diabetes  -     Ambulatory Referral to Endocrinology    3. Essential hypertension  Assessment & Plan:  Hypertension is stable and controlled  Medication changes per orders.  Dietary sodium restriction.  Weight loss.  Regular aerobic exercise.  Blood pressure will be reassessed in 3 months.  Will restart lisinopril. Monitor BP and keep diary.  Work on lifestyle changes as well.     Orders:  -     lisinopril (PRINIVIL,ZESTRIL) 10 MG tablet; Take 1 tablet by mouth Daily.  Dispense: 90 tablet; Refill: 1    Check labs  Restart lisinopril  Referral to endo  During this visit for their annual exam, we reviewed their personal history, social history and family history. We went over their medications and all the recommended health maintenance items for their age group. They were given the opportunity to ask questions and discuss other concerns.   Discussed importance of regular exercise and recommended starting or continuing a regular exercise program for good health. The patient was also encouraged to lose weight for better health.   The importance of monitoring blood sugar regularly was reviewed.  The importance of monitoring the HgBA1C level regularly was reviewed.  The importance of monitoring urine microalbumin regularly to check for kidney damage was reviewed.   The importance of annual eye exams to prevent blindness was reviewed.  The importance of proper foot care and regularly checking feet  to prevent sores and possibly loss of limbs was reviewed.         Follow Up   Return in about 3 months (around 9/18/2024) for HTN follow up, Diabetes follow up.  Patient was given instructions and counseling regarding his condition or for health maintenance advice. Please see specific information pulled into the AVS if appropriate.

## 2024-06-18 NOTE — ASSESSMENT & PLAN NOTE
Hypertension is stable and controlled  Medication changes per orders.  Dietary sodium restriction.  Weight loss.  Regular aerobic exercise.  Blood pressure will be reassessed in 3 months.  Will restart lisinopril. Monitor BP and keep diary.  Work on lifestyle changes as well.

## 2024-06-19 ENCOUNTER — LAB (OUTPATIENT)
Dept: FAMILY MEDICINE CLINIC | Facility: CLINIC | Age: 24
End: 2024-06-19
Payer: COMMERCIAL

## 2024-06-19 DIAGNOSIS — Z00.00 PREVENTATIVE HEALTH CARE: ICD-10-CM

## 2024-06-19 LAB
ALBUMIN SERPL-MCNC: 4.3 G/DL (ref 3.5–5.2)
ALBUMIN UR-MCNC: <1.2 MG/DL
ALBUMIN/GLOB SERPL: 1.6 G/DL
ALP SERPL-CCNC: 65 U/L (ref 39–117)
ALT SERPL W P-5'-P-CCNC: 33 U/L (ref 1–41)
ANION GAP SERPL CALCULATED.3IONS-SCNC: 6.4 MMOL/L (ref 5–15)
AST SERPL-CCNC: 21 U/L (ref 1–40)
BASOPHILS # BLD AUTO: 0.05 10*3/MM3 (ref 0–0.2)
BASOPHILS NFR BLD AUTO: 0.7 % (ref 0–1.5)
BILIRUB SERPL-MCNC: 0.6 MG/DL (ref 0–1.2)
BUN SERPL-MCNC: 9 MG/DL (ref 6–20)
BUN/CREAT SERPL: 11.7 (ref 7–25)
CALCIUM SPEC-SCNC: 9.5 MG/DL (ref 8.6–10.5)
CHLORIDE SERPL-SCNC: 106 MMOL/L (ref 98–107)
CHOLEST SERPL-MCNC: 189 MG/DL (ref 0–200)
CO2 SERPL-SCNC: 24.6 MMOL/L (ref 22–29)
CREAT SERPL-MCNC: 0.77 MG/DL (ref 0.76–1.27)
DEPRECATED RDW RBC AUTO: 42 FL (ref 37–54)
EGFRCR SERPLBLD CKD-EPI 2021: 127.4 ML/MIN/1.73
EOSINOPHIL # BLD AUTO: 0.35 10*3/MM3 (ref 0–0.4)
EOSINOPHIL NFR BLD AUTO: 4.6 % (ref 0.3–6.2)
ERYTHROCYTE [DISTWIDTH] IN BLOOD BY AUTOMATED COUNT: 12.8 % (ref 12.3–15.4)
GLOBULIN UR ELPH-MCNC: 2.7 GM/DL
GLUCOSE SERPL-MCNC: 105 MG/DL (ref 65–99)
HBA1C MFR BLD: 6.2 % (ref 4.8–5.6)
HCT VFR BLD AUTO: 46.3 % (ref 37.5–51)
HDLC SERPL-MCNC: 32 MG/DL (ref 40–60)
HGB BLD-MCNC: 15.2 G/DL (ref 13–17.7)
IMM GRANULOCYTES # BLD AUTO: 0.01 10*3/MM3 (ref 0–0.05)
IMM GRANULOCYTES NFR BLD AUTO: 0.1 % (ref 0–0.5)
LDLC SERPL CALC-MCNC: 132 MG/DL (ref 0–100)
LDLC/HDLC SERPL: 4.04 {RATIO}
LYMPHOCYTES # BLD AUTO: 3.25 10*3/MM3 (ref 0.7–3.1)
LYMPHOCYTES NFR BLD AUTO: 42.3 % (ref 19.6–45.3)
MCH RBC QN AUTO: 29.4 PG (ref 26.6–33)
MCHC RBC AUTO-ENTMCNC: 32.8 G/DL (ref 31.5–35.7)
MCV RBC AUTO: 89.6 FL (ref 79–97)
MONOCYTES # BLD AUTO: 0.45 10*3/MM3 (ref 0.1–0.9)
MONOCYTES NFR BLD AUTO: 5.9 % (ref 5–12)
NEUTROPHILS NFR BLD AUTO: 3.57 10*3/MM3 (ref 1.7–7)
NEUTROPHILS NFR BLD AUTO: 46.4 % (ref 42.7–76)
NRBC BLD AUTO-RTO: 0 /100 WBC (ref 0–0.2)
PLATELET # BLD AUTO: 301 10*3/MM3 (ref 140–450)
PMV BLD AUTO: 9.8 FL (ref 6–12)
POTASSIUM SERPL-SCNC: 4.4 MMOL/L (ref 3.5–5.2)
PROT SERPL-MCNC: 7 G/DL (ref 6–8.5)
RBC # BLD AUTO: 5.17 10*6/MM3 (ref 4.14–5.8)
SODIUM SERPL-SCNC: 137 MMOL/L (ref 136–145)
TRIGL SERPL-MCNC: 138 MG/DL (ref 0–150)
TSH SERPL DL<=0.05 MIU/L-ACNC: 1.46 UIU/ML (ref 0.27–4.2)
VLDLC SERPL-MCNC: 25 MG/DL (ref 5–40)
WBC NRBC COR # BLD AUTO: 7.68 10*3/MM3 (ref 3.4–10.8)

## 2024-06-19 PROCEDURE — 83036 HEMOGLOBIN GLYCOSYLATED A1C: CPT | Performed by: NURSE PRACTITIONER

## 2024-06-19 PROCEDURE — 36415 COLL VENOUS BLD VENIPUNCTURE: CPT

## 2024-06-19 PROCEDURE — 80050 GENERAL HEALTH PANEL: CPT | Performed by: NURSE PRACTITIONER

## 2024-06-19 PROCEDURE — 82043 UR ALBUMIN QUANTITATIVE: CPT | Performed by: NURSE PRACTITIONER

## 2024-06-19 PROCEDURE — 80061 LIPID PANEL: CPT | Performed by: NURSE PRACTITIONER

## 2024-06-20 ENCOUNTER — TELEPHONE (OUTPATIENT)
Dept: FAMILY MEDICINE CLINIC | Facility: CLINIC | Age: 24
End: 2024-06-20
Payer: COMMERCIAL

## 2024-06-20 NOTE — TELEPHONE ENCOUNTER
Relay:Called patient, unable to reach. Left detailed voice message. Please relay message below.       ----- Message from Esther Fontenot sent at 6/20/2024  9:24 AM EDT -----  A1c was down to 6.2!!

## 2024-08-13 ENCOUNTER — OFFICE (OUTPATIENT)
Dept: URBAN - METROPOLITAN AREA CLINIC 64 | Facility: CLINIC | Age: 24
End: 2024-08-13
Payer: COMMERCIAL

## 2024-08-13 VITALS
DIASTOLIC BLOOD PRESSURE: 96 MMHG | WEIGHT: 315 LBS | SYSTOLIC BLOOD PRESSURE: 136 MMHG | HEIGHT: 74 IN | SYSTOLIC BLOOD PRESSURE: 143 MMHG | DIASTOLIC BLOOD PRESSURE: 90 MMHG | HEART RATE: 99 BPM

## 2024-08-13 DIAGNOSIS — L40.9 PSORIASIS, UNSPECIFIED: ICD-10-CM

## 2024-08-13 DIAGNOSIS — R94.5 ABNORMAL RESULTS OF LIVER FUNCTION STUDIES: ICD-10-CM

## 2024-08-13 DIAGNOSIS — R79.89 OTHER SPECIFIED ABNORMAL FINDINGS OF BLOOD CHEMISTRY: ICD-10-CM

## 2024-08-13 PROCEDURE — 99212 OFFICE O/P EST SF 10 MIN: CPT | Performed by: NURSE PRACTITIONER

## 2025-02-27 ENCOUNTER — TRANSCRIBE ORDERS (OUTPATIENT)
Dept: PHYSICAL THERAPY | Facility: CLINIC | Age: 25
End: 2025-02-27
Payer: COMMERCIAL

## 2025-02-27 DIAGNOSIS — G89.29 CHRONIC LOW BACK PAIN WITH LEFT-SIDED SCIATICA, UNSPECIFIED BACK PAIN LATERALITY: Primary | ICD-10-CM

## 2025-02-27 DIAGNOSIS — M54.42 CHRONIC LOW BACK PAIN WITH LEFT-SIDED SCIATICA, UNSPECIFIED BACK PAIN LATERALITY: Primary | ICD-10-CM

## 2025-03-11 ENCOUNTER — TREATMENT (OUTPATIENT)
Dept: PHYSICAL THERAPY | Facility: CLINIC | Age: 25
End: 2025-03-11
Payer: COMMERCIAL

## 2025-03-11 DIAGNOSIS — M54.42 CHRONIC LEFT-SIDED LOW BACK PAIN WITH LEFT-SIDED SCIATICA: Primary | ICD-10-CM

## 2025-03-11 DIAGNOSIS — M54.32 SCIATICA OF LEFT SIDE: ICD-10-CM

## 2025-03-11 DIAGNOSIS — R68.89 ACTIVITY INTOLERANCE: ICD-10-CM

## 2025-03-11 DIAGNOSIS — G89.29 CHRONIC LEFT-SIDED LOW BACK PAIN WITH LEFT-SIDED SCIATICA: Primary | ICD-10-CM

## 2025-03-11 NOTE — PATIENT INSTRUCTIONS
Access Code: [email or web address]  URL: [email or web address]  Date: 03/11/2025  Prepared by: Darron Enriquez    Exercises  - Supine Posterior Pelvic Tilt  - 1 x daily - 5 x weekly - 3 sets - 10 reps - 3s hold  - Double Leg Bridge  - 1 x daily - 5 x weekly - 3 sets - 10 reps - 3s hold  - Supine Lower Trunk Rotation  - 1 x daily - 7 x weekly - 1 sets - 30 reps  - Supine Sciatic Nerve Glide  - 1 x daily - 7 x weekly - 3 sets - 10 reps

## 2025-03-11 NOTE — PROGRESS NOTES
Physical Therapy Initial Evaluation and Plan of Care  Clinic Location [address] [facility] [address], [place] [id number]    Patient: Seth Wild   : 2000  Diagnosis/ICD-10 Code:  Chronic left-sided low back pain with left-sided sciatica [M54.42, G89.29]  Referring practitioner: Gilbert Bryan MD  Date of Initial Visit: 3/11/2025  Today's Date: 3/11/2025  Patient seen for 1 session         Visit Diagnoses:    ICD-10-CM ICD-9-CM   1. Chronic left-sided low back pain with left-sided sciatica  M54.42 724.2    G89.29 724.3     338.29   2. Activity intolerance  R68.89 780.99   3. Sciatica of left side  M54.32 724.3         Subjective Questionnaire: Oswestry: 22%      Subjective Evaluation    History of Present Illness  Mechanism of injury: Pt reports sudden onset of LBP in November after waking up from bed one day. Starts in low back and radiates behind the leg to L knee, sometimes past his knee. Pain that goes behind L thigh is sharp, and will occasionally turn into a tingling once past the knee. Pain is worst with prolonged sitting, standing and when he sneezes or is bending over. Ibuprofen relieves his pain. Pain also recreated when lifting his bent L leg. Differentiates this pain from his arthritis pain. Denies any hx of low back injury.      PMHx of psoriatic arthritis.      Patient Occupation: [employer], a lot of sitting. Quality of life: good    Pain  Current pain ratin  At best pain ratin  At worst pain ratin  Location: L side low back  Quality: sharp  Relieving factors: medications and rest  Aggravating factors: ambulation and prolonged positioning  Progression: no change (slightly better since taken ibuprofen)    Social Support  Lives with: parents    Patient Goals  Patient goals for therapy: decreased pain             Objective          Static Posture     Lumbar Spine   Decreased lordosis.     Active Range of Motion     Lumbar   Flexion: WFL and with pain  Extension:  WFL  Left lateral flexion: WFL  Right lateral flexion: WFL  Left rotation: WFL  Right rotation: WFL    Strength/Myotome Testing     Left Hip   Planes of Motion   Flexion: 4+  Abduction: 4+  Adduction: 5    Right Hip   Planes of Motion   Flexion: 4+  Abduction: 4+  Adduction: 5    Left Knee   Flexion: 4+  Extension: 4+    Right Knee   Flexion: 4+  Extension: 4+    Left Ankle/Foot   Dorsiflexion: 5    Right Ankle/Foot   Dorsiflexion: 5    Additional Strength Details  Symptoms recreated behind L leg into buttock when performing seated knee extension on L.    Muscle Activation     Additional Muscle Activation Details  Core strength MMT score 4-/5.    Tests       Thoracic   Positive slump.     Lumbar     Left   Positive passive SLR.     Right   Negative passive SLR.     Left Pelvic Girdle/Sacrum   Negative: gapping, sacral spring and thigh thrust.     Right Pelvic Girdle/Sacrum   Negative: gapping, sacral spring and thigh thrust.     Additional Tests Details  Pain recreated at LB with passive SLR on L.    Ambulation     Observational Gait   Walking speed and stride length within functional limits.   Left arm swing: decreased  Right arm swing: decreased          Assessment & Plan       Assessment  Impairments: abnormal coordination, activity intolerance, impaired physical strength, lacks appropriate home exercise program and pain with function   Functional limitations: walking, uncomfortable because of pain, sitting, standing, stooping and unable to perform repetitive tasks   Assessment details: The patient is a 24 y.o. male who presents to physical therapy today for acute LBP. Upon initial evaluation, the patient demonstrates the following impairments: core strength deficits, neural tension on L LE, lumbar flexion intolerance due to pain, and activity intolerance due to symptoms. Examination findings indicate lumbar radiculopathy.     Due to these impairments, the patient is unable to perform or has difficulty with the  following functional tasks: prolonged walking, prolonged sitting, bending over, prolonged standing, and pain with washing/dressing. The patient would benefit from skilled PT services to address functional limitations and impairments and to improve patient quality of life.      Prognosis: good    Goals  Plan Goals: ST. Pt will be independent and compliant with initial HEP in 4-6 weeks.  2. Pt will report a 25% improvement in symptoms since starting therapy in 4-6 weeks.  3. Pt will report pain level at worst <5 during prolonged activity in 4-6 weeks.  4. Pt will show improvement in body mechanics when lifting and sitting in 2 weeks in order to decrease strain on back.     LT. Pt will be independent with final HEP for self-management of condition by DC.  2. Pt will improve score on Back Index to less than 10% by DC.   3. Pt will report a 75% improvement in symptoms by DC in order to allow return to PLOF.  4. Pt will improve lumbar AROM to WNL while pain-free in order to complete ADLs with improved function by DC.     Plan  Therapy options: will be seen for skilled therapy services  Planned modality interventions: dry needling, cryotherapy, iontophoresis, TENS, high voltage pulsed current (pain management), high voltage pulsed current (dermal wound therapy), high voltage pulsed current (spasm management), hydrotherapy, thermotherapy (hydrocollator packs), ultrasound, traction, microcurrent electrical stimulation and electrical stimulation/Russian stimulation  Planned therapy interventions: abdominal trunk stabilization, strengthening, stretching, therapeutic activities, transfer training, spinal/joint mobilization, soft tissue mobilization, postural training, neuromuscular re-education, motor coordination training, manual therapy, ADL retraining, balance/weight-bearing training, body mechanics training, flexibility, functional ROM exercises, gait training, home exercise program, IADL retraining and joint  mobilization  Other planned therapy interventions: Group Therapy  Frequency: 2x week  Duration in weeks: 12  Treatment plan discussed with: patient            Timed:         Manual Therapy:         mins  80776;     Therapeutic Exercise:    9     mins  95014;     Neuromuscular Ian:        mins  83250;    Therapeutic Activity:     8     mins  10327;     Gait Training:           mins  79634;     Ultrasound:          mins  40865;    Ionto                                   mins   26261  Self Care                       8     mins   93754  Canalith Repos         mins 14558      Un-Timed:  Electrical Stimulation:         mins  90363 ( );  Dry Needling          mins self-pay  Traction          mins 36091  Low Eval     22     Mins  83132  Mod Eval          Mins  15771  High Eval                            Mins  60528        Timed Treatment:   25   mins   Total Treatment:     45   mins          PT: Darron Enriquez PT     [place] License #: [id number]  Electronically signed by Darron Enriquez PT, 03/11/25, 11:04 AM EDT    Certification Period: 3/11/2025 thru 6/8/2025  I certify that the therapy services are furnished while this patient is under my care.  The services outlined above are required by this patient, and will be reviewed every 90 days.         Physician Signature:__________________________________________________    PHYSICIAN: Gilbert Bryan MD  NPI: [id number]                                      DATE:      Please sign and return via fax to [email or web address] . Thank you, [facility].

## 2025-03-13 ENCOUNTER — TREATMENT (OUTPATIENT)
Dept: PHYSICAL THERAPY | Facility: CLINIC | Age: 25
End: 2025-03-13
Payer: COMMERCIAL

## 2025-03-13 DIAGNOSIS — G89.29 CHRONIC LEFT-SIDED LOW BACK PAIN WITH LEFT-SIDED SCIATICA: Primary | ICD-10-CM

## 2025-03-13 DIAGNOSIS — M54.32 SCIATICA OF LEFT SIDE: ICD-10-CM

## 2025-03-13 DIAGNOSIS — R68.89 ACTIVITY INTOLERANCE: ICD-10-CM

## 2025-03-13 DIAGNOSIS — M54.42 CHRONIC LEFT-SIDED LOW BACK PAIN WITH LEFT-SIDED SCIATICA: Primary | ICD-10-CM

## 2025-03-13 NOTE — PROGRESS NOTES
Physical Therapy Daily Treatment Note  [facility] [place]   [address], [address]  [place] [address]  P: [phone]  F: [phone]    Patient: Seth Wild   : 2000  Referring practitioner: Gilbert Bryan MD  Date of Initial Visit: Type: THERAPY  Noted: 3/11/2025  Today's Date: 3/13/2025  Patient seen for 2 sessions       Visit Diagnoses:    ICD-10-CM ICD-9-CM   1. Chronic left-sided low back pain with left-sided sciatica  M54.42 724.2    G89.29 724.3     338.29   2. Activity intolerance  R68.89 780.99   3. Sciatica of left side  M54.32 724.3         Subjective     Seth Wild reports: Back feels about the same. Stretches in HEP are helping.         Objective   See Exercise, Manual, and Modality Logs for complete treatment.       Assessment:  Pt benefits from verbal cues with PPT not to strain neck with exercise. Pt continues to find relief of LBP s/s with flexion based lumbar interventions. Bird Dogs added for strengthening of posterior chain, providing more stability to lumbar spine. HEP updated (see flowsheet) and reviewed with pt.        Plan:  Progress per Plan of Care            Timed:         Manual Therapy:         mins  79721;     Therapeutic Exercise:    14     mins  10163;     Neuromuscular Ian:    10    mins  81034;    Therapeutic Activity:     10     mins  47431;     Gait Training:           mins  36751;     Ultrasound:          mins  64239;    Ionto                                   mins  85642  Self Care                            mins  11377    Un-Timed:  Electrical Stimulation:         mins  97377 ( );  Traction          mins 13460        Timed Treatment:   34   mins   Total Treatment:     34   mins      Campbell Moran, Physical Therapist Assistant  [place] License #: [id number]

## 2025-03-18 ENCOUNTER — TELEPHONE (OUTPATIENT)
Dept: PHYSICAL THERAPY | Facility: CLINIC | Age: 25
End: 2025-03-18

## 2025-03-18 NOTE — TELEPHONE ENCOUNTER
Caller: Seth Wild    Relationship: Self    What was the call regarding: HAVING AN ARTHRITIS FLARE UP

## 2025-03-20 ENCOUNTER — TREATMENT (OUTPATIENT)
Dept: PHYSICAL THERAPY | Facility: CLINIC | Age: 25
End: 2025-03-20
Payer: COMMERCIAL

## 2025-03-20 DIAGNOSIS — G89.29 CHRONIC LEFT-SIDED LOW BACK PAIN WITH LEFT-SIDED SCIATICA: Primary | ICD-10-CM

## 2025-03-20 DIAGNOSIS — M54.32 SCIATICA OF LEFT SIDE: ICD-10-CM

## 2025-03-20 DIAGNOSIS — M54.42 CHRONIC LEFT-SIDED LOW BACK PAIN WITH LEFT-SIDED SCIATICA: Primary | ICD-10-CM

## 2025-03-20 DIAGNOSIS — R68.89 ACTIVITY INTOLERANCE: ICD-10-CM

## 2025-03-20 NOTE — PROGRESS NOTES
Physical Therapy Daily Treatment Note  [facility] Physical Therapy [place]   [address]  [place] [address]  P: [phone]  F: [phone]    Patient: Seth Wild   : 2000  Referring practitioner: Gilbert Bryan MD  Date of Initial Visit: Type: THERAPY  Noted: 3/11/2025  Today's Date: 3/20/2025  Patient seen for 3 sessions       Visit Diagnoses:    ICD-10-CM ICD-9-CM   1. Chronic left-sided low back pain with left-sided sciatica  M54.42 724.2    G89.29 724.3     338.29   2. Activity intolerance  R68.89 780.99   3. Sciatica of left side  M54.32 724.3         Subjective     Seth Wild reports: Had an arthritis flare up the other day. Makes exercises at home intolerable. Mostly hurts my hands and feet. Haven't performed HEP since Monday.         Objective   See Exercise, Manual, and Modality Logs for complete treatment.       Assessment:  Pt tolerated progression of supine core strengthening exercise well, w/o adverse effects. Increased time spent in lumbar extension with no c/o LBP associated. Subjective reports for LBP remain relatively low. Pt will continue to benefit from lumar-extension based exercises for improved strength and stability.         Plan:  Progress per Plan of Care            Timed:         Manual Therapy:         mins  74539;     Therapeutic Exercise:    16     mins  54886;     Neuromuscular Ian:    10    mins  27794;    Therapeutic Activity:     10     mins  65067;     Gait Training:           mins  85015;     Ultrasound:          mins  08908;    Ionto                                   mins  58534  Self Care                            mins  92153    Un-Timed:  Electrical Stimulation:         mins  96183 ( );  Traction          mins 49994        Timed Treatment:   36   mins   Total Treatment:     36   mins      Campbell Moran, Physical Therapist Assistant  [place] License #: [id number]

## 2025-03-25 ENCOUNTER — TREATMENT (OUTPATIENT)
Dept: PHYSICAL THERAPY | Facility: CLINIC | Age: 25
End: 2025-03-25
Payer: COMMERCIAL

## 2025-03-25 DIAGNOSIS — R68.89 ACTIVITY INTOLERANCE: ICD-10-CM

## 2025-03-25 DIAGNOSIS — G89.29 CHRONIC LEFT-SIDED LOW BACK PAIN WITH LEFT-SIDED SCIATICA: Primary | ICD-10-CM

## 2025-03-25 DIAGNOSIS — M54.32 SCIATICA OF LEFT SIDE: ICD-10-CM

## 2025-03-25 DIAGNOSIS — M54.42 CHRONIC LEFT-SIDED LOW BACK PAIN WITH LEFT-SIDED SCIATICA: Primary | ICD-10-CM

## 2025-03-25 PROCEDURE — 97112 NEUROMUSCULAR REEDUCATION: CPT

## 2025-03-25 PROCEDURE — 97110 THERAPEUTIC EXERCISES: CPT

## 2025-03-25 NOTE — PROGRESS NOTES
Physical Therapy Daily Treatment Note  [facility] Physical Therapy [place]   [address], [address]  [place] [address]  P: [phone]  F: [phone]    Patient: Seth Wild   : 2000  Referring practitioner: Gilbert Bryan MD  Date of Initial Visit: Type: THERAPY  Noted: 3/11/2025  Today's Date: 3/25/2025  Patient seen for 4 sessions       Visit Diagnoses:    ICD-10-CM ICD-9-CM   1. Chronic left-sided low back pain with left-sided sciatica  M54.42 724.2    G89.29 724.3     338.29   2. Activity intolerance  R68.89 780.99   3. Sciatica of left side  M54.32 724.3         Seth Wild reports: he has noticed less flare-ups overall when sitting and driving, but did have a flare-up a couple nights ago which he attributes to not taking ibuprofen before going to bed. Voices his flare-up episode he did notice more tingling down the L leg, bu that primarily his issue is at the low back.    Subjective     Objective   See Exercise, Manual, and Modality Logs for complete treatment.       Assessment:  Pt tolerated today's treatment session well with no adverse responses during treatment session. Pt continues to display limitations including LBP with radiculopathy but has improved symptoms based off subjective reports. Cable dead lift and pallof press added to exercises in clinic. Pt will benefit from continued skilled PT services.       Plan:  Progress per POC.         Timed:         Manual Therapy:         mins  54795;     Therapeutic Exercise:    25     mins  59516;     Neuromuscular Ian:    10    mins  97677;    Therapeutic Activity:          mins  39498;     Gait Training:           mins  81392;     Ultrasound:          mins  16111;    Ionto                                   mins  83172  Self Care                            mins  00812  Traction          mins 46103      Un-Timed:  Canalith Repos         mins 16903  Electrical Stimulation:         mins  15683 ( );  Dry  Needling          mins self-pay  Traction          mins 82702        Timed Treatment:   35   mins   Total Treatment:     35   mins    Darron Enriquez, PT  [place] License #: [id number]    Physical Therapist

## 2025-04-01 ENCOUNTER — TREATMENT (OUTPATIENT)
Dept: PHYSICAL THERAPY | Facility: CLINIC | Age: 25
End: 2025-04-01
Payer: COMMERCIAL

## 2025-04-01 DIAGNOSIS — M54.42 CHRONIC LEFT-SIDED LOW BACK PAIN WITH LEFT-SIDED SCIATICA: Primary | ICD-10-CM

## 2025-04-01 DIAGNOSIS — M54.32 SCIATICA OF LEFT SIDE: ICD-10-CM

## 2025-04-01 DIAGNOSIS — G89.29 CHRONIC LEFT-SIDED LOW BACK PAIN WITH LEFT-SIDED SCIATICA: Primary | ICD-10-CM

## 2025-04-01 DIAGNOSIS — R68.89 ACTIVITY INTOLERANCE: ICD-10-CM

## 2025-04-01 PROCEDURE — 97530 THERAPEUTIC ACTIVITIES: CPT

## 2025-04-01 PROCEDURE — 97110 THERAPEUTIC EXERCISES: CPT

## 2025-04-01 PROCEDURE — 97112 NEUROMUSCULAR REEDUCATION: CPT

## 2025-04-01 NOTE — PROGRESS NOTES
Re-Assessment / Re-Certification    Time In 1100     Time Out 1140    Patient: Seth Wild   : 2000  Diagnosis/ICD-10 Code:  Chronic left-sided low back pain with left-sided sciatica [M54.42, G89.29]  Referring practitioner: Gilbert Bryan MD  Date of Initial Visit: Type: THERAPY  Noted: 3/11/2025  Today's Date: 2025  Patient seen for 5 sessions      Subjective:   Seth Wild reports: he definitely feels as if he is getting better. Voices that symptoms are not as bad. Reports 70% improvement since starting PT, worst pain has been 3/10 this past week after prolonged sitting.  Subjective Questionnaire: Oswestry: 8%  Clinical Progress: improved  Home Program Compliance: Yes  Treatment has included: therapeutic exercise, neuromuscular re-education, therapeutic activity, and cryotherapy       Objective          Active Range of Motion     Lumbar   Flexion: WFL  Extension: WFL  Left lateral flexion: WFL  Right lateral flexion: WFL  Left rotation: WFL  Right rotation: WFL    Additional Active Range of Motion Details  No pain.    Strength/Myotome Testing     Left Hip   Planes of Motion   Flexion: 4+  Abduction: 4+  Adduction: 4+    Right Hip   Planes of Motion   Flexion: 4+  Abduction: 4+  Adduction: 4+    Additional Strength Details  Core strength 4-/5.    Tests       Thoracic   Positive slump.       Assessment/Plan  Pt tolerated today's treatment session well with no adverse responses during treatment session. Pt continues to display limitations including neural tension down L LE as well as poor core strength, but he is progressing well evident by improved tolerance to strength training. Dead bug exercise and hamstring stretch added to HEP today. Pt will benefit from continued skilled PT services.    Progress toward previous goals: Partially Met    Goals  Plan Goals: ST. Pt will be independent and compliant with initial HEP in 4-6 weeks.  2. Pt will report a 25% improvement in symptoms since  starting therapy in 4-6 weeks.  3. Pt will report pain level at worst <5 during prolonged activity in 4-6 weeks.  4. Pt will show improvement in body mechanics when lifting and sitting in 2 weeks in order to decrease strain on back.   (ALL STG MET)     LT. Pt will be independent with final HEP for self-management of condition by DC. (Progressing)  2. Pt will improve score on Back Index to less than 10% by DC. (Met)  3. Pt will report a 75% improvement in symptoms by DC in order to allow return to PLOF. (Not met)  4. Pt will improve lumbar AROM to WNL while pain-free in order to complete ADLs with improved function by DC. (Met)      Recommendations: Continue with recommendations to tx pt 1x/week.  Timeframe: 6 weeks  Prognosis to achieve goals: good    PT Signature: Darron Enriquez, PT  [place] License #: [id number]    Based upon review of the patient's progress and continued therapy plan, it is my medical opinion that Seth Wild should continue physical therapy treatment at [facility]  [address] [place] [id number] [phone].    Signature: __________________________________  Gilbert Bryan MD    Manual Therapy:         mins  18588;  Therapeutic Exercise:    23     mins  73766;     Neuromuscular Ian:    9    mins  42043;    Therapeutic Activity:     8     mins  94080;     Gait Training:           mins  39902;     Ultrasound:          mins  03057;    Electrical Stimulation:         mins  55331 ( );  Dry Needling          mins self-pay    Timed Treatment:   40   mins   Total Treatment:     40   mins

## 2025-04-03 ENCOUNTER — TREATMENT (OUTPATIENT)
Dept: PHYSICAL THERAPY | Facility: CLINIC | Age: 25
End: 2025-04-03
Payer: COMMERCIAL

## 2025-04-03 DIAGNOSIS — R68.89 ACTIVITY INTOLERANCE: ICD-10-CM

## 2025-04-03 DIAGNOSIS — M54.32 SCIATICA OF LEFT SIDE: ICD-10-CM

## 2025-04-03 DIAGNOSIS — M54.42 CHRONIC LEFT-SIDED LOW BACK PAIN WITH LEFT-SIDED SCIATICA: Primary | ICD-10-CM

## 2025-04-03 DIAGNOSIS — G89.29 CHRONIC LEFT-SIDED LOW BACK PAIN WITH LEFT-SIDED SCIATICA: Primary | ICD-10-CM

## 2025-04-03 NOTE — PROGRESS NOTES
Physical Therapy Daily Treatment Note  [facility] [place]   [address], [address]  [place] [address]  P: [phone]  F: [phone]    Patient: Seth Wild   : 2000  Referring practitioner: Gilbert Bryan MD  Date of Initial Visit: Type: THERAPY  Noted: 3/11/2025  Today's Date: 4/3/2025  Patient seen for 6 sessions       Visit Diagnoses:    ICD-10-CM ICD-9-CM   1. Chronic left-sided low back pain with left-sided sciatica  M54.42 724.2    G89.29 724.3     338.29   2. Activity intolerance  R68.89 780.99   3. Sciatica of left side  M54.32 724.3         Seth Wild reports: no new complaints. Back is feeling good. Has had no flare-ups.    Subjective     Objective   See Exercise, Manual, and Modality Logs for complete treatment.       Assessment:  Pt tolerated today's treatment session well with no adverse responses during treatment session. Pt continues to display limitations including strength deficits and low back pain impacting his tolerance to activity, but he is progressing well evident by subjective reports and improved tolerance to exercises. Bridge with alternating kick out added to HEP today. Pt will benefit from continued skilled PT services.       Plan:  Progress per POC.         Timed:         Manual Therapy:         mins  17120;     Therapeutic Exercise:    23     mins  71510;     Neuromuscular Ian:    8    mins  15915;    Therapeutic Activity:     8     mins  73112;     Gait Training:           mins  28869;     Ultrasound:          mins  80862;    Ionto                                   mins  20633  Self Care                            mins  17127  Traction          mins 68612      Un-Timed:  Canalith Repos         mins 98025  Electrical Stimulation:         mins  63678 ( );  Dry Needling          mins self-pay  Traction          mins 38754        Timed Treatment:   39   mins   Total Treatment:     39   mins    Darron Enriquez, PT  [place]  License #: [id number]    Physical Therapist

## 2025-04-09 ENCOUNTER — TREATMENT (OUTPATIENT)
Dept: PHYSICAL THERAPY | Facility: CLINIC | Age: 25
End: 2025-04-09
Payer: COMMERCIAL

## 2025-04-09 DIAGNOSIS — M54.42 CHRONIC LEFT-SIDED LOW BACK PAIN WITH LEFT-SIDED SCIATICA: Primary | ICD-10-CM

## 2025-04-09 DIAGNOSIS — G89.29 CHRONIC LEFT-SIDED LOW BACK PAIN WITH LEFT-SIDED SCIATICA: Primary | ICD-10-CM

## 2025-04-09 DIAGNOSIS — M54.32 SCIATICA OF LEFT SIDE: ICD-10-CM

## 2025-04-09 DIAGNOSIS — R68.89 ACTIVITY INTOLERANCE: ICD-10-CM

## 2025-04-09 NOTE — PROGRESS NOTES
Physical Therapy Daily Treatment Note  [facility] Physical Therapy [place]   [address], [address]  [place] [address]  P: [phone]  F: [phone]    Patient: Seth Wild   : 2000  Referring practitioner: Gilbert Bryan MD  Date of Initial Visit: Type: THERAPY  Noted: 3/11/2025  Today's Date: 2025  Patient seen for 7 sessions       Visit Diagnoses:    ICD-10-CM ICD-9-CM   1. Chronic left-sided low back pain with left-sided sciatica  M54.42 724.2    G89.29 724.3     338.29   2. Activity intolerance  R68.89 780.99   3. Sciatica of left side  M54.32 724.3         Seth Wild reports: a slight increase of back pain from last week, but still feels way better than when he started PT.     Subjective     Objective   See Exercise, Manual, and Modality Logs for complete treatment.       Assessment:  Pt tolerated today's treatment session well with no adverse responses during treatment session. Pt continues to display limitations including LBP and core strength deficits impacting his tolerance to ADLs. Exercises progressed today with free weights instead of cable machine. Pt will benefit from continued skilled PT services.       Plan:  Progress per POC.         Timed:         Manual Therapy:         mins  40183;     Therapeutic Exercise:    15     mins  09870;     Neuromuscular Ian:    11    mins  73423;    Therapeutic Activity:     10     mins  80771;     Gait Training:           mins  21092;     Ultrasound:          mins  77859;    Ionto                                   mins  23972  Self Care                            mins  40379  Traction          mins 63474      Un-Timed:  Canalith Repos         mins 73475  Electrical Stimulation:         mins  26835 ( );  Dry Needling          mins self-pay  Traction          mins 01817        Timed Treatment:   36   mins   Total Treatment:     36   mins    Darron Enriquez PT  [place] License #: [id number]    Physical Therapist

## 2025-04-16 ENCOUNTER — TREATMENT (OUTPATIENT)
Dept: PHYSICAL THERAPY | Facility: CLINIC | Age: 25
End: 2025-04-16
Payer: COMMERCIAL

## 2025-04-16 DIAGNOSIS — M54.32 SCIATICA OF LEFT SIDE: ICD-10-CM

## 2025-04-16 DIAGNOSIS — M54.42 CHRONIC LEFT-SIDED LOW BACK PAIN WITH LEFT-SIDED SCIATICA: Primary | ICD-10-CM

## 2025-04-16 DIAGNOSIS — R68.89 ACTIVITY INTOLERANCE: ICD-10-CM

## 2025-04-16 DIAGNOSIS — G89.29 CHRONIC LEFT-SIDED LOW BACK PAIN WITH LEFT-SIDED SCIATICA: Primary | ICD-10-CM

## 2025-04-16 NOTE — PROGRESS NOTES
Discharge Summary  Discharge Summary from Physical Therapy Report      Dates  PT visit: 3/11/25-4/16/25  Number of Visits: 8     Discharge Status of Patient: See PT Note dated 4/16/25    Goals: All Met    Discharge Plan: Continue with current home exercise program as instructed    Comments pt has progressed well and is managing symptoms independently.    Date of Discharge 4/16/25        Darron Enriquez, PT  Physical Therapist

## 2025-04-16 NOTE — PROGRESS NOTES
Physical Therapy Re-Evaluation Note    Time In 1309     Time Out 1339    Patient: Seth Wild   : 2000  Diagnosis/ICD-10 Code:  Chronic left-sided low back pain with left-sided sciatica [M54.42, G89.29]  Referring practitioner: Gilbert Bryan MD  Date of Initial Visit: Type: THERAPY  Noted: 3/11/2025  Today's Date: 2025  Patient seen for 8 sessions      Subjective:   Seth Wild reports: he has made a lot of improvements in his condition and is ready for DC following today;s treatment. Voices he only experiences pain when doing extreme movements. Is no longer having pain with walking, standing, ambulating stairs, and is no longer having pain when coughing or sneezing which he states was his CC. Reports pain no worse than a 3-4/10. Stated he was 5-10% from being 100% improved.  Subjective Questionnaire: Oswestry: 4%  Clinical Progress: improved  Home Program Compliance: Yes  Treatment has included: therapeutic exercise, neuromuscular re-education, manual therapy, and therapeutic activity       Objective          Active Range of Motion     Lumbar   Normal active range of motion    Additional Active Range of Motion Details  1/10 pain with extension.    Strength/Myotome Testing     Left Hip   Planes of Motion   Flexion: WFL  Extension: WFL  Abduction: WFL  Adduction: WFL    Right Hip   Planes of Motion   Flexion: WFL  Extension: WFL  Abduction: WFL  Adduction: WFL    Tests       Thoracic   Positive slump.     Left Hip   SLR: Negative.     Right Hip   SLR: Negative.     Additional Tests Details  Slump positive on L with knee extension.      Assessment/Plan  Pt has progressed well in PT and is now managing his symptoms. All goals have been met. Re-evaluation performed today due to change in POC. His HEP was reviewed and printed off for him today with addition of suitcase carry and dead lift exercise. He was educated on importance of HEP to maintain symptom free and to keep pursuing an active  lifestyle. He will be discharged today and will continue HEP at home independently.  Progress toward previous goals: All Met    Goals  Plan Goals: ST. Pt will be independent and compliant with initial HEP in 4-6 weeks.  2. Pt will report a 25% improvement in symptoms since starting therapy in 4-6 weeks.  3. Pt will report pain level at worst <5 during prolonged activity in 4-6 weeks.  4. Pt will show improvement in body mechanics when lifting and sitting in 2 weeks in order to decrease strain on back.   (ALL STG MET)     LT. Pt will be independent with final HEP for self-management of condition by DC. (met)  2. Pt will improve score on Back Index to less than 10% by DC. (Met)  3. Pt will report a 75% improvement in symptoms by DC in order to allow return to PLOF. (met)  4. Pt will improve lumbar AROM to WNL while pain-free in order to complete ADLs with improved function by DC. (Met)  ALL STG AND LTG MET.      Recommendations: Discharge      PT Signature: Darron Enriquez, PT  [place] License #: [id number]    Based upon review of the patient's progress and continued therapy plan, it is my medical opinion that Seth Wild should continue physical therapy treatment at [facility]  [address] [place] [id number] [phone].    Signature: __________________________________  Gilbert Bryan MD    Manual Therapy:         mins  54078;  Therapeutic Exercise:    10     mins  99792;     Neuromuscular Ian:        mins  03778;    Therapeutic Activity:     9     mins  37108;     Gait Training:           mins  [id number];     Ultrasound:          mins  23980;    Electrical Stimulation:         mins  08527 ( );  Dry Needling          mins self-pay  Re-Evaluation  __11___ mins [id number]    Timed Treatment:   19   mins   Total Treatment:     30   mins